# Patient Record
Sex: FEMALE | Race: BLACK OR AFRICAN AMERICAN
[De-identification: names, ages, dates, MRNs, and addresses within clinical notes are randomized per-mention and may not be internally consistent; named-entity substitution may affect disease eponyms.]

---

## 2015-03-13 VITALS — DIASTOLIC BLOOD PRESSURE: 69 MMHG | SYSTOLIC BLOOD PRESSURE: 157 MMHG

## 2017-02-22 ENCOUNTER — HOSPITAL ENCOUNTER (OUTPATIENT)
Dept: HOSPITAL 61 - KCIC CT | Age: 77
Discharge: HOME | End: 2017-02-22
Attending: INTERNAL MEDICINE
Payer: MEDICARE

## 2017-02-22 DIAGNOSIS — R22.0: Primary | ICD-10-CM

## 2017-02-22 PROCEDURE — 70486 CT MAXILLOFACIAL W/O DYE: CPT

## 2017-02-22 NOTE — [facility]
--------------------PQRS STATEMENT------------------- 

One or more of the following individualized dose reduction techniques were

utilized for this study: 

1.Automated exposure control 

2.Adjustment of the mA and/or kV according to patient size 

3.Use of iterative reconstruction technique 

------------------------------------------------------------------ 

CT maxillofacial 

 

Indication: Reason For Study Reason: LEFT FACIAL SWELLING / Spl. 

Instructions: Non contrast facial bones ordered / History: Bump and 

swelling to Lt temporal area. Hx meningioma and occlusion RCA 

 

 

Technique: multiple contiguous axial images were obtained through the 

facial bones. Coronal and sagittal reformations were created. 

 

Findings: 

 

There is no facial fracture. Orbital rims are intact. Temporomandibular 

joints are maintained. Paranasal sinuses are clear. Ostiomeatal units are 

patent. Globes and orbits are within normal limits. There is metallic 

particulate debris superficial to the left zygomatic process, and along 

the frontal scalp the largest metallic foreign body on the right. There is

also metallic debris within the middle ear cavity on the right. These are 

of indeterminate origin. Limited intracranial contents demonstrates 

presence of a solid mass along the cribriform plate measures 3.7 by 2.7 by

1.8 centimeters in could represent a meningioma. 

 

Impression: 

- Negative for facial fracture.

- 3.7 centimeter solid mass along the cribriform plate possibly 

representing a meningioma.

- Metallic radiopaque debris along the frontal scalp and within the right 

middle ear cavity.

 

 

 

Electronically signed by: Golden Miller (Feb 22, 2017 15:23:11)

## 2017-04-05 ENCOUNTER — HOSPITAL ENCOUNTER (INPATIENT)
Dept: HOSPITAL 61 - ER | Age: 77
LOS: 4 days | Discharge: HOME | DRG: 190 | End: 2017-04-09
Attending: INTERNAL MEDICINE | Admitting: INTERNAL MEDICINE
Payer: MEDICARE

## 2017-04-05 VITALS — BODY MASS INDEX: 33.66 KG/M2 | WEIGHT: 190 LBS | HEIGHT: 63 IN

## 2017-04-05 VITALS — DIASTOLIC BLOOD PRESSURE: 70 MMHG | SYSTOLIC BLOOD PRESSURE: 160 MMHG

## 2017-04-05 DIAGNOSIS — Z79.52: ICD-10-CM

## 2017-04-05 DIAGNOSIS — M79.7: ICD-10-CM

## 2017-04-05 DIAGNOSIS — G93.6: ICD-10-CM

## 2017-04-05 DIAGNOSIS — Z92.3: ICD-10-CM

## 2017-04-05 DIAGNOSIS — J44.0: Primary | ICD-10-CM

## 2017-04-05 DIAGNOSIS — G62.9: ICD-10-CM

## 2017-04-05 DIAGNOSIS — Z86.011: ICD-10-CM

## 2017-04-05 DIAGNOSIS — M54.9: ICD-10-CM

## 2017-04-05 DIAGNOSIS — J18.9: ICD-10-CM

## 2017-04-05 DIAGNOSIS — M31.5: ICD-10-CM

## 2017-04-05 DIAGNOSIS — E03.9: ICD-10-CM

## 2017-04-05 DIAGNOSIS — J96.22: ICD-10-CM

## 2017-04-05 DIAGNOSIS — G47.00: ICD-10-CM

## 2017-04-05 DIAGNOSIS — J96.21: ICD-10-CM

## 2017-04-05 DIAGNOSIS — D32.0: ICD-10-CM

## 2017-04-05 DIAGNOSIS — Z88.8: ICD-10-CM

## 2017-04-05 DIAGNOSIS — J30.9: ICD-10-CM

## 2017-04-05 DIAGNOSIS — F17.200: ICD-10-CM

## 2017-04-05 DIAGNOSIS — G44.209: ICD-10-CM

## 2017-04-05 DIAGNOSIS — J44.1: ICD-10-CM

## 2017-04-05 DIAGNOSIS — Z82.49: ICD-10-CM

## 2017-04-05 DIAGNOSIS — M81.0: ICD-10-CM

## 2017-04-05 DIAGNOSIS — Z88.0: ICD-10-CM

## 2017-04-05 DIAGNOSIS — I11.9: ICD-10-CM

## 2017-04-05 DIAGNOSIS — Z86.718: ICD-10-CM

## 2017-04-05 DIAGNOSIS — G89.29: ICD-10-CM

## 2017-04-05 DIAGNOSIS — M19.90: ICD-10-CM

## 2017-04-05 LAB
ALBUMIN SERPL-MCNC: 3.6 G/DL (ref 3.4–5)
ALBUMIN/GLOB SERPL: 0.7 {RATIO} (ref 1–1.7)
ALP SERPL-CCNC: 86 U/L (ref 46–116)
ALT SERPL-CCNC: 13 U/L (ref 14–59)
ANION GAP SERPL CALC-SCNC: 8 MMOL/L (ref 6–14)
AST SERPL-CCNC: 16 U/L (ref 15–37)
BASOPHILS # BLD AUTO: 0.1 X10^3/UL (ref 0–0.2)
BASOPHILS NFR BLD: 1 % (ref 0–3)
BILIRUB SERPL-MCNC: 0.3 MG/DL (ref 0.2–1)
BUN SERPL-MCNC: 9 MG/DL (ref 7–20)
BUN/CREAT SERPL: 13 (ref 6–20)
CALCIUM SERPL-MCNC: 9.8 MG/DL (ref 8.5–10.1)
CHLORIDE SERPL-SCNC: 107 MMOL/L (ref 98–107)
CO2 SERPL-SCNC: 33 MMOL/L (ref 21–32)
CREAT SERPL-MCNC: 0.7 MG/DL (ref 0.6–1)
EOSINOPHIL NFR BLD: 1 % (ref 0–3)
ERYTHROCYTE [DISTWIDTH] IN BLOOD BY AUTOMATED COUNT: 19.2 % (ref 11.5–14.5)
GFR SERPLBLD BASED ON 1.73 SQ M-ARVRAT: 98.7 ML/MIN
GLOBULIN SER-MCNC: 4.9 G/DL (ref 2.2–3.8)
GLUCOSE SERPL-MCNC: 92 MG/DL (ref 70–99)
HCO3 BLDA-SCNC: 30 MMOL/L (ref 21–28)
HCT VFR BLD CALC: 35.9 % (ref 36–47)
HGB BLD-MCNC: 11.2 G/DL (ref 12–15.5)
INSPIRATION SETTING TIME VENT: 32
LYMPHOCYTES # BLD: 2.1 X10^3/UL (ref 1–4.8)
LYMPHOCYTES NFR BLD AUTO: 26 % (ref 24–48)
MCH RBC QN AUTO: 27 PG (ref 25–35)
MCHC RBC AUTO-ENTMCNC: 31 G/DL (ref 31–37)
MCV RBC AUTO: 86 FL (ref 79–100)
MONOCYTES NFR BLD: 8 % (ref 0–9)
NEUTROPHILS NFR BLD AUTO: 64 % (ref 31–73)
OBC FLU: (no result)
PCO2 BLDA: 57 MMHG (ref 35–46)
PH ABG: 7.34 (ref 7.35–7.45)
PLATELET # BLD AUTO: 298 X10^3/UL (ref 140–400)
PO2 BLDA: 79 MMHG (ref 65–108)
POTASSIUM SERPL-SCNC: 3.7 MMOL/L (ref 3.5–5.1)
PROT SERPL-MCNC: 8.5 G/DL (ref 6.4–8.2)
RBC # BLD AUTO: 4.2 X10^6/UL (ref 3.5–5.4)
SAO2 % BLDA: 94 % (ref 92–99)
SODIUM SERPL-SCNC: 148 MMOL/L (ref 136–145)
WBC # BLD AUTO: 8 X10^3/UL (ref 4–11)

## 2017-04-05 PROCEDURE — 85651 RBC SED RATE NONAUTOMATED: CPT

## 2017-04-05 PROCEDURE — 80053 COMPREHEN METABOLIC PANEL: CPT

## 2017-04-05 PROCEDURE — 96375 TX/PRO/DX INJ NEW DRUG ADDON: CPT

## 2017-04-05 PROCEDURE — 80048 BASIC METABOLIC PNL TOTAL CA: CPT

## 2017-04-05 PROCEDURE — A9585 GADOBUTROL INJECTION: HCPCS

## 2017-04-05 PROCEDURE — 71010: CPT

## 2017-04-05 PROCEDURE — 84484 ASSAY OF TROPONIN QUANT: CPT

## 2017-04-05 PROCEDURE — 82805 BLOOD GASES W/O2 SATURATION: CPT

## 2017-04-05 PROCEDURE — 85027 COMPLETE CBC AUTOMATED: CPT

## 2017-04-05 PROCEDURE — 94760 N-INVAS EAR/PLS OXIMETRY 1: CPT

## 2017-04-05 PROCEDURE — 96374 THER/PROPH/DIAG INJ IV PUSH: CPT

## 2017-04-05 PROCEDURE — 93005 ELECTROCARDIOGRAM TRACING: CPT

## 2017-04-05 PROCEDURE — 87804 INFLUENZA ASSAY W/OPTIC: CPT

## 2017-04-05 PROCEDURE — 36600 WITHDRAWAL OF ARTERIAL BLOOD: CPT

## 2017-04-05 PROCEDURE — 94620: CPT

## 2017-04-05 PROCEDURE — 93971 EXTREMITY STUDY: CPT

## 2017-04-05 PROCEDURE — 70553 MRI BRAIN STEM W/O & W/DYE: CPT

## 2017-04-05 PROCEDURE — 36415 COLL VENOUS BLD VENIPUNCTURE: CPT

## 2017-04-05 PROCEDURE — 71275 CT ANGIOGRAPHY CHEST: CPT

## 2017-04-05 PROCEDURE — 83880 ASSAY OF NATRIURETIC PEPTIDE: CPT

## 2017-04-05 PROCEDURE — 94640 AIRWAY INHALATION TREATMENT: CPT

## 2017-04-05 RX ADMIN — METHYLPREDNISOLONE SODIUM SUCCINATE SCH MG: 125 INJECTION, POWDER, FOR SOLUTION INTRAMUSCULAR; INTRAVENOUS at 21:43

## 2017-04-05 RX ADMIN — TIZANIDINE PRN MG: 4 TABLET ORAL at 21:42

## 2017-04-05 RX ADMIN — IPRATROPIUM BROMIDE AND ALBUTEROL SULFATE SCH ML: .5; 3 SOLUTION RESPIRATORY (INHALATION) at 20:00

## 2017-04-05 RX ADMIN — ZOLPIDEM TARTRATE PRN MG: 5 TABLET ORAL at 21:42

## 2017-04-05 RX ADMIN — HYDROCODONE BITARTRATE AND ACETAMINOPHEN PRN TAB: 7.5; 325 TABLET ORAL at 21:43

## 2017-04-05 NOTE — PHYS DOC
Past Medical History


Past Medical History:  COPD, Hypertension, Hypothyroid, Other


Additional Past Medical Histor:  neuropathy, osteoporosis, sleeping problem, 

chronic pain.


Past Surgical History:  Other


Additional Past Surgical Histo:  unknown


Alcohol Use:  None


Drug Use:  None





Adult General


Chief Complaint


Chief Complaint:  SHORTNESS OF BREATH





HPI


HPI


Patient is a 76  year old female brought to the ED by a family member with a 

complaint of short of air for 2 days. She has had a cough. She does have COPD 

and has been using her breathing treatments. She is not on oxygen at home. She 

denies edema. She's not had heart failure.





Review of Systems


Review of Systems





Constitutional: She has felt feverish


Eyes: Denies change in visual acuity, redness, or eye pain []


HENT: Denies nasal congestion or sore throat []


Respiratory: As in history of present illness


Cardiovascular: Denies chest pain or edema


GI: Denies abdominal pain, nausea, vomiting, bloody stools or diarrhea []


: Denies dysuria or hematuria []


Musculoskeletal: Denies back pain or joint pain []


Integument: Denies rash or skin lesions []


Neurologic: Denies headache, focal weakness or sensory changes []





Current Medications


Current Medications





 Current Medications








 Medications


  (Trade)  Dose


 Ordered  Sig/Bouchra  Start Time


 Stop Time Status Last Admin


Dose Admin


 


 Albuterol Sulfate


  (Ventolin Neb


 Soln)  10 mg  1X  ONCE  4/5/17 17:00


 4/5/17 17:01 DC 4/5/17 16:33


10 MG


 


 Albuterol/


 Ipratropium


  (Duoneb)  3 ml  1X  ONCE  4/5/17 16:45


 4/5/17 16:46 DC 4/5/17 16:33


3 ML


 


 Fentanyl Citrate


  (Fentanyl 2ml


 Vial)  50 mcg  1X  ONCE  4/5/17 17:00


 4/5/17 17:01 DC 4/5/17 17:25


50 MCG


 


 Furosemide


  (Lasix)  40 mg  1X  ONCE  4/5/17 17:45


 4/5/17 17:46 DC 4/5/17 19:13


40 MG


 


 Methylprednisolone


 Sodium Succinate


  (Solu-Medrol


 125mg Vial)  80 mg  1X  ONCE  4/5/17 16:45


 4/5/17 16:46 DC 4/5/17 17:25


80 MG











Allergies


Allergies





 Allergies








Coded Allergies Type Severity Reaction Last Updated Verified


 


  Penicillins Allergy Intermediate Rash 4/5/17 Yes


 


  ibuprofen Allergy Intermediate rash 3/13/15 Yes











Physical Exam


Physical Exam





Constitutional: Well developed, well nourished, speaking 2-3 words, appears to 

have mild to moderate dyspnea,


HENT: Normocephalic, atraumatic, bilateral external ears normal, nose normal. []


Eyes:  conjunctiva normal, no discharge. [] 


Neck: Normal range of motion,  no stridor. [] 


Cardiovascular:Heart rate regular rhythm, no murmur []


Lungs & Thorax: Breath sounds present bilaterally, mildly diminished, prolonged 

expiratory phase, expiratory wheezes present throughout


Abdomen: Bowel sounds normal, soft, no tenderness, no masses, no pulsatile 

masses. [] 


Skin: Warm, dry, no erythema, no rash. [] 


Extremities: No tenderness, no cyanosis, no clubbing, ROM intact, no edema. [] 


Neurologic: Alert and oriented X 3, normal motor function, normal sensory 

function, no focal deficits noted. []





Current Patient Data


Vital Signs





 Vital Signs








  Date Time  Temp Pulse Resp B/P Pulse Ox O2 Delivery O2 Flow Rate FiO2


 


4/5/17 17:30  92 26 185/81 99 Nasal Cannula 2 


 


4/5/17 15:48 98.2       





 98.2       








Lab Values





 Laboratory Tests








Test


  4/5/17


15:55 4/5/17


16:00 4/5/17


16:28


 


White Blood Count


  8.0x10^3/uL


(4.0-11.0) 


  


 


 


Red Blood Count


  4.20x10^6/uL


(3.50-5.40) 


  


 


 


Hemoglobin


  11.2g/dL


(12.0-15.5)  L 


  


 


 


Hematocrit


  35.9%


(36.0-47.0)  L 


  


 


 


Mean Corpuscular Volume 86fL ()    


 


Mean Corpuscular Hemoglobin 27pg (25-35)    


 


Mean Corpuscular Hemoglobin


Concent 31g/dL (31-37)


  


  


 


 


Red Cell Distribution Width


  19.2%


(11.5-14.5)  H 


  


 


 


Platelet Count


  298x10^3/uL


(140-400) 


  


 


 


Neutrophils (%) (Auto) 64% (31-73)    


 


Lymphocytes (%) (Auto) 26% (24-48)    


 


Monocytes (%) (Auto) 8% (0-9)    


 


Eosinophils (%) (Auto) 1% (0-3)    


 


Basophils (%) (Auto) 1% (0-3)    


 


Neutrophils # (Auto)


  5.2x10^3uL


(1.8-7.7) 


  


 


 


Lymphocytes # (Auto)


  2.1x10^3/uL


(1.0-4.8) 


  


 


 


Monocytes # (Auto)


  0.6x10^3/uL


(0.0-1.1) 


  


 


 


Eosinophils # (Auto)


  0.1x10^3/uL


(0.0-0.7) 


  


 


 


Basophils # (Auto)


  0.1x10^3/uL


(0.0-0.2) 


  


 


 


Sodium Level


  148mmol/L


(136-145)  H 


  


 


 


Potassium Level


  3.7mmol/L


(3.5-5.1) 


  


 


 


Chloride Level


  107mmol/L


() 


  


 


 


Carbon Dioxide Level


  33mmol/L


(21-32)  H 


  


 


 


Anion Gap 8 (6-14)    


 


Blood Urea Nitrogen 9mg/dL (7-20)    


 


Creatinine


  0.7mg/dL


(0.6-1.0) 


  


 


 


Estimated GFR


(Cockcroft-Gault) 98.7  


  


  


 


 


BUN/Creatinine Ratio 13 (6-20)    


 


Glucose Level


  92mg/dL


(70-99) 


  


 


 


Calcium Level


  9.8mg/dL


(8.5-10.1) 


  


 


 


Total Bilirubin


  0.3mg/dL


(0.2-1.0) 


  


 


 


Aspartate Amino Transferase


(AST) 16U/L (15-37)  


  


  


 


 


Alanine Aminotransferase (ALT)


  13U/L (14-59)


L 


  


 


 


Alkaline Phosphatase


  86U/L ()


  


  


 


 


Troponin I Quantitative


  < 0.017ng/mL


(0.000-0.055) 


  


 


 


NT-Pro-B-Type Natriuretic


Peptide 116pg/mL


(0-449) 


  


 


 


Total Protein


  8.5g/dL


(6.4-8.2)  H 


  


 


 


Albumin


  3.6g/dL


(3.4-5.0) 


  


 


 


Albumin/Globulin Ratio


  0.7 (1.0-1.7)


L 


  


 


 


O2 Saturation  94% (92-99)   


 


Arterial Blood pH


  


  7.34


(7.35-7.45)  L 


 


 


Arterial Blood pCO2 at


Patient Temp 


  57mmHg (35-46)


H 


 


 


Arterial Blood pO2 at Patient


Temp 


  79mmHg


() 


 


 


Arterial Blood HCO3


  


  30mmol/L


(21-28)  H 


 


 


Arterial Blood Base Excess


  


  4mmol/L (-3-3)


H 


 


 


FiO2  32.0   


 


Influenza Type A Antigen


  


  


  Negative


(NEGATIVE)


 


Influenza Type B Antigen


  


  


  Negative


(NEGATIVE)





 Laboratory Tests


4/5/17 15:55








 Laboratory Tests


4/5/17 15:55














EKG


EKG


[]





Radiology/Procedures


Radiology/Procedures


One view chest x-ray read by me. No acute cardiopulmonary process. []





Course & Med Decision Making


Course & Med Decision Making


Pertinent Labs and Imaging studies reviewed. (See chart for details)





ABG on 3 L. PH 7.34 PCO2 57 PO2 79





Patient with COPD history presents with dyspnea for 2 days. She was started on 

a continuous albuterol with DuoNeb. She was saturating at 100% on 3 L I turned 

her down to 2 L. After her continuous nebulizer she felt a little better but is 

still wheezy and still mildly dyspneic with talking. She was also given IV 

steroids. I discussed with the patient being admitted to the hospital for 

treatment of her COPD exacerbation and she is agreeable to that.





Discussed the case with Dr. Matute, the patient's physician, who will admit the 

patient. I wrote bridge orders.





[]





Dragon Disclaimer


Dragon Disclaimer


This electronic medical record was generated, in whole or in part, using a 

voice recognition dictation system.





Departure


Departure


Impression:  


 Primary Impression:  


 COPD with acute exacerbation


Disposition:  09 ADMITTED AS INPATIENT


Admitting Physician:  Saida Matute


Condition:  GUARDED


Referrals:  


SAIDA MATUTE MD (PCP)








WINSTON GILES MD Apr 5, 2017 17:50

## 2017-04-05 NOTE — ACF
Admission Forms Criteria


                                                           COPD





Clinical Indications for Admission to Inpatient Care


                                                                                

(Place 'X' for any and all applicable criteria):





Admission is indicated for ANY ONE of the following (1)(2)(3):


[X]I.    Acute exacerbation by high-risk comorbidity (e.g., pneumonia, 

dysrhythmia, heart failure, pleural effusion, 


        pneumothorax) or severe underlying COPD (e.g., steroid dependent)


[ ]II.   Inpatient admission required rather than observation care (see Chronic 

Obstructive Pulmonary


        Disease: Observation Care) because of ANY ONE of the following:


        [ ]a)   New or pre-existing signs or symptoms of COPD (eg, dyspnea or 

Tachypnea at rest or with


                 minimal activity) that persist despite outpatient and 

observation care treatment


        [ ]b)   New-onset hypoxemia (room air SaO2 less than 90%, PO2 less than 

60 mm Hg (8.0 kPa))


                 that persists despite outpatient and observation care treatment


        [ ]c)   Worsening of pre-existing hypoxemia (eg, new or increased 

requirement for supplemental


                 oxygen to maintain oxygenation at baseline level) that 

persists despite outpatient and


                 observation care treatment, with oxygen treatment needs 

performable only in acute inpatient setting


        [ ]d)   Hypercarbia (PCO2 greater than 40 mm Hg (5.3 kPa))-induced 

respiratory acidosis (pH less


                 than 7.35) that persists despite outpatient and observation 

care treatment


        [ ]e)   Supplemental oxygen or respiratory treatments for over 24 hours 

that are performable only in acute inpatient setting


        [ ]f)    Chest tube placement with active evacuation (e.g., suction, 

drainage) (5)


        [ ]g)   Other condition, treatment or monitoring requiring inpatient 

admission


[ ]III.  Planned invasive surgical or diagnostic procedures requiring acute-

care hospitalization 


[ ]IV. Acute respiratory failure (e.g., uncompensated hypercarbia, severe 

hypoxemia) 


[ ]V.  Severe comorbid condition (e.g., severe steroid myopathy, acute 

vertebral fracture) that has acutely worsened pulmonary function


[ ]VI. Confusion state, lethargy, obtundation, stupor or coma











Extended stay beyond goal length of stay may be needed for (31)(32):


[ ]a ) Respiratory Failure.


[ ]b) Severe or persisting hypoxemia or hypercarbia


[ ]c) Severe or persistent dyspnea


[ ]d) Comorbidities (e.g. chronic heart failure, atrial fibrillation with rapid 

response, pneumonia)


[ ]e) Malnutrition








The original [facility] content created by [facility] 

[facility] has been revised. 


The portions of the content which have been revised are identified through the 

use of italic text or in bold, and [facility] 


has neither reviewed nor approved the  modified material. All other unmodified 

content is copyright   [facility].





Please see references footnoted in the original [facility] edition 

2016


Admission Criteria Met?:  Yes








JOSE MCMAHON Apr 5, 2017 18:00

## 2017-04-05 NOTE — RAD
Exam:  AP portable chest. 



History:  Shortness of air, cough.



Comparison:  11/4/2016.



Findings:  Cardiac silhouette remains enlarged. No pneumothorax or pleural

effusion is seen. Pulmonary vascular redistribution is seen, compatible with

pulmonary vascular congestion. No focal consolidation is identified.     



Impression:  

1.  Pulmonary vascular congestion.

## 2017-04-06 VITALS — DIASTOLIC BLOOD PRESSURE: 85 MMHG | SYSTOLIC BLOOD PRESSURE: 130 MMHG

## 2017-04-06 VITALS — DIASTOLIC BLOOD PRESSURE: 68 MMHG | SYSTOLIC BLOOD PRESSURE: 154 MMHG

## 2017-04-06 VITALS — SYSTOLIC BLOOD PRESSURE: 145 MMHG | DIASTOLIC BLOOD PRESSURE: 82 MMHG

## 2017-04-06 VITALS — SYSTOLIC BLOOD PRESSURE: 127 MMHG | DIASTOLIC BLOOD PRESSURE: 64 MMHG

## 2017-04-06 VITALS — DIASTOLIC BLOOD PRESSURE: 82 MMHG | SYSTOLIC BLOOD PRESSURE: 145 MMHG

## 2017-04-06 VITALS — DIASTOLIC BLOOD PRESSURE: 84 MMHG | SYSTOLIC BLOOD PRESSURE: 159 MMHG

## 2017-04-06 VITALS — SYSTOLIC BLOOD PRESSURE: 168 MMHG | DIASTOLIC BLOOD PRESSURE: 83 MMHG

## 2017-04-06 RX ADMIN — IPRATROPIUM BROMIDE AND ALBUTEROL SULFATE SCH ML: .5; 3 SOLUTION RESPIRATORY (INHALATION) at 12:00

## 2017-04-06 RX ADMIN — IPRATROPIUM BROMIDE AND ALBUTEROL SULFATE SCH ML: .5; 3 SOLUTION RESPIRATORY (INHALATION) at 11:12

## 2017-04-06 RX ADMIN — HYDROCODONE BITARTRATE AND ACETAMINOPHEN PRN TAB: 7.5; 325 TABLET ORAL at 09:34

## 2017-04-06 RX ADMIN — GABAPENTIN SCH MG: 300 CAPSULE ORAL at 09:37

## 2017-04-06 RX ADMIN — IPRATROPIUM BROMIDE AND ALBUTEROL SULFATE SCH ML: .5; 3 SOLUTION RESPIRATORY (INHALATION) at 07:27

## 2017-04-06 RX ADMIN — ZOLPIDEM TARTRATE PRN MG: 5 TABLET ORAL at 21:34

## 2017-04-06 RX ADMIN — METHYLPREDNISOLONE SODIUM SUCCINATE SCH MG: 125 INJECTION, POWDER, FOR SOLUTION INTRAMUSCULAR; INTRAVENOUS at 21:34

## 2017-04-06 RX ADMIN — GABAPENTIN SCH MG: 300 CAPSULE ORAL at 14:03

## 2017-04-06 RX ADMIN — CLONAZEPAM SCH MG: 0.5 TABLET ORAL at 09:38

## 2017-04-06 RX ADMIN — METHYLPREDNISOLONE SODIUM SUCCINATE SCH MG: 125 INJECTION, POWDER, FOR SOLUTION INTRAMUSCULAR; INTRAVENOUS at 09:40

## 2017-04-06 RX ADMIN — LEVOFLOXACIN SCH MG: 500 TABLET, FILM COATED ORAL at 14:03

## 2017-04-06 RX ADMIN — GABAPENTIN SCH MG: 300 CAPSULE ORAL at 21:34

## 2017-04-06 RX ADMIN — IPRATROPIUM BROMIDE AND ALBUTEROL SULFATE SCH ML: .5; 3 SOLUTION RESPIRATORY (INHALATION) at 15:26

## 2017-04-06 RX ADMIN — HYDROCODONE BITARTRATE AND ACETAMINOPHEN PRN TAB: 7.5; 325 TABLET ORAL at 21:35

## 2017-04-06 RX ADMIN — CLONAZEPAM SCH MG: 0.5 TABLET ORAL at 21:34

## 2017-04-06 RX ADMIN — DULOXETINE HYDROCHLORIDE SCH MG: 30 CAPSULE, DELAYED RELEASE ORAL at 09:37

## 2017-04-06 RX ADMIN — FUROSEMIDE SCH MG: 40 TABLET ORAL at 09:36

## 2017-04-06 RX ADMIN — AMLODIPINE BESYLATE SCH MG: 10 TABLET ORAL at 09:35

## 2017-04-06 RX ADMIN — IPRATROPIUM BROMIDE AND ALBUTEROL SULFATE SCH ML: .5; 3 SOLUTION RESPIRATORY (INHALATION) at 20:51

## 2017-04-06 NOTE — RAD
CT of the chest with contrast, 4/6/2017:



History: Shortness of breath



Multidetector CT imaging was performed following an IV bolus injection of

iodinated contrast material. Multiplanar reconstructions were produced

including coronal MIP images. Comparison is made to a study from 6/17/2016



No filling defects are seen in the central pulmonary arteries to suggest

pulmonary emboli. There is moderate calcific plaquing of the thoracic aorta

without evidence of aneurysm. Moderate scattered coronary calcifications are

present. The heart is mildly enlarged. Several small mediastinal lymph nodes

are identified without evidence of pathologic enlargement. The left lobe of

the thyroid gland is surgically absent.



There is a 10-11 mm nodule in the right middle lobe which is unchanged on

studies dating back to at least 10/9/2008. This suggests a benign etiology.



There are multiple other scattered nodular opacities in the lungs which have

developed since 6/17/2016. These include a 6 mm mildly irregular nodule in the

lateral aspect of the left lower lobe as seen on image 70 of series #3, a 4 mm

nodule in the left lower lobe as seen on image 65 and a 6 mm nodule in the

lateral aspect of left upper lobe as seen on image 56. There are additional

smaller scattered tree-in-bud type opacities in both lungs. For example, in

the posterior aspect of the right upper lobe as seen on image 45 and in the

right lower lobe on image 70.



There is underlying emphysema in the lungs. No pleural fluid is evident.



A small hepatic cyst is again noted.



There are moderate scattered degenerative changes in the spine. Old healed rib

fractures are present on the right.



IMPRESSION:

1. No CT evidence of central pulmonary emboli.

2. Moderate coronary artery calcifications.

3. Emphysema.

4. Stable 1 cm right middle lobe pulmonary nodule.

5. New mild scattered tree-in-bud and nodular pulmonary opacities. The

findings suggest infection and/or small airway disease. A neoplastic etiology

is less likely. CT follow-up is suggested.









PQRS Compliance Statement:



One or more of the following individualized dose reduction techniques were

utilized for this examination:

1. Automated exposure control

2. Adjustment of the mA and/or kV according to patient size

3. Use of iterative reconstruction technique

## 2017-04-06 NOTE — PDOC
OBJECTIVE


Vital Signs





Vital Signs








  Date Time  Temp Pulse Resp B/P Pulse Ox O2 Delivery O2 Flow Rate FiO2


 


4/6/17 09:35  72  154/68    


 


4/6/17 09:34      Nasal Cannula 2.0 


 


4/6/17 08:00      Nasal Cannula 2.0 


 


4/6/17 07:28     93 Nasal Cannula 2.0 


 


4/6/17 07:08 97.9 72 20 154/68 94 Nasal Cannula 3.0 





 97.9       


 


4/6/17 03:00 96.8 85 18 127/64 94 Nasal Cannula  





 96.8       


 


4/5/17 23:10      Nasal Cannula 3.0 


 


4/5/17 23:00 99.3 90 18 160/70 95   





 99.3       


 


4/5/17 22:40   17   Nasal Cannula 3.0 


 


4/5/17 21:43   18   Nasal Cannula 3.0 


 


4/5/17 21:34     98 Nasal Cannula 2.0 


 


4/5/17 18:30  80 26 169/74 98 Nasal Cannula 2 


 


4/5/17 18:00  80 24 168/79 100 Nasal Cannula 2 


 


4/5/17 17:30  92 26 185/81 99 Nasal Cannula 2 


 


4/5/17 17:00  80 28 178/85 97 Nasal Cannula 3 


 


4/5/17 16:15     94 Nasal Cannula 2.0 


 


4/5/17 16:00  82 27 193/95 97 Nasal Cannula 3 


 


4/5/17 15:56     99 Nasal Cannula 3.0 


 


4/5/17 15:48 98.2 90 28 193/95 96 Nasal Cannula 3 





 98.2       








I & O











 Intake and Output 


 


 4/6/17





 07:00


 


Intake Total 240 ml


 


Output Total 200 ml


 


Balance 40 ml


 


 


 


Intake Oral 240 ml


 


Output Urine Total 200 ml











ASSESSMENT/PLAN


Assessment/Plan


[id number] H&P dictated


Problems:  





COMMENT


Lab





Laboratory Tests








Test


  4/5/17


15:55 4/5/17


16:00 4/5/17


16:28


 


White Blood Count


  8.0x10^3/uL


(4.0-11.0) 


  


 


 


Red Blood Count


  4.20x10^6/uL


(3.50-5.40) 


  


 


 


Hemoglobin


  11.2g/dL


(12.0-15.5) 


  


 


 


Hematocrit


  35.9%


(36.0-47.0) 


  


 


 


Mean Corpuscular Volume 86fL ()   


 


Mean Corpuscular Hemoglobin 27pg (25-35)   


 


Mean Corpuscular Hemoglobin


Concent 31g/dL (31-37) 


  


  


 


 


Red Cell Distribution Width


  19.2%


(11.5-14.5) 


  


 


 


Platelet Count


  298x10^3/uL


(140-400) 


  


 


 


Neutrophils (%) (Auto) 64% (31-73)   


 


Lymphocytes (%) (Auto) 26% (24-48)   


 


Monocytes (%) (Auto) 8% (0-9)   


 


Eosinophils (%) (Auto) 1% (0-3)   


 


Basophils (%) (Auto) 1% (0-3)   


 


Neutrophils # (Auto)


  5.2x10^3uL


(1.8-7.7) 


  


 


 


Lymphocytes # (Auto)


  2.1x10^3/uL


(1.0-4.8) 


  


 


 


Monocytes # (Auto)


  0.6x10^3/uL


(0.0-1.1) 


  


 


 


Eosinophils # (Auto)


  0.1x10^3/uL


(0.0-0.7) 


  


 


 


Basophils # (Auto)


  0.1x10^3/uL


(0.0-0.2) 


  


 


 


Sodium Level


  148mmol/L


(136-145) 


  


 


 


Potassium Level


  3.7mmol/L


(3.5-5.1) 


  


 


 


Chloride Level


  107mmol/L


() 


  


 


 


Carbon Dioxide Level


  33mmol/L


(21-32) 


  


 


 


Anion Gap 8 (6-14)   


 


Blood Urea Nitrogen 9mg/dL (7-20)   


 


Creatinine


  0.7mg/dL


(0.6-1.0) 


  


 


 


Estimated GFR


(Cockcroft-Gault) 98.7 


  


  


 


 


BUN/Creatinine Ratio 13 (6-20)   


 


Glucose Level


  92mg/dL


(70-99) 


  


 


 


Calcium Level


  9.8mg/dL


(8.5-10.1) 


  


 


 


Total Bilirubin


  0.3mg/dL


(0.2-1.0) 


  


 


 


Aspartate Amino Transf


(AST/SGOT) 16U/L (15-37) 


  


  


 


 


Alanine Aminotransferase


(ALT/SGPT) 13U/L (14-59) 


  


  


 


 


Alkaline Phosphatase 86U/L ()   


 


Troponin I Quantitative


  < 0.017ng/mL


(0.000-0.055) 


  


 


 


NT-Pro-B-Type Natriuretic


Peptide 116pg/mL


(0-449) 


  


 


 


Total Protein


  8.5g/dL


(6.4-8.2) 


  


 


 


Albumin


  3.6g/dL


(3.4-5.0) 


  


 


 


Albumin/Globulin Ratio 0.7 (1.0-1.7)   


 


O2 Saturation  94% (92-99)  


 


Arterial Blood pH


  


  7.34


(7.35-7.45) 


 


 


Arterial Blood pCO2 at


Patient Temp 


  57mmHg (35-46) 


  


 


 


Arterial Blood pO2 at Patient


Temp 


  79mmHg


() 


 


 


Arterial Blood HCO3


  


  30mmol/L


(21-28) 


 


 


Arterial Blood Base Excess  4mmol/L (-3-3)  


 


FiO2  32.0  


 


Influenza Type A Antigen


  


  


  Negative


(NEGATIVE)


 


Influenza Type B Antigen


  


  


  Negative


(NEGATIVE)














SAIDA MATUTE MD Apr 6, 2017 10:08

## 2017-04-06 NOTE — EKG
[facility]

              [address] [place] [address]

Test Date:    2017               Test Time:    15:59:00

Pat Name:     GLENYS MUNOZ            Department:   

Patient ID:   [id number]           Room:          

Gender:       F                        Technician:   

:          [id number]               Requested By: WINSTON GILES

Order Number: [id number]            Reading MD:     

                                 Measurements

Intervals                              Axis          

Rate:         85                       P:            59

MN:           148                      QRS:          98

QRSD:         122                      T:            33

QT:           396                                    

QTc:          477                                    

                           Interpretive Statements

SINUS RHYTHM

RIGHTWARD AXIS

INCOMPLETE RIGHT BUNDLE BRANCH BLOCK

RVH WITH REPOLARIZATION ABNORMALITY

ABNORMAL ECG

RI6.01

No previous ECG available for comparison

## 2017-04-06 NOTE — CONS
DATE OF CONSULTATION:  04/06/2017



ATTENDING PHYSICIAN:  Dr. Jostin El.



REASON FOR CONSULTATION:  Dyspnea.



HISTORY OF PRESENT ILLNESS:  This is a 76-year-old who came to Emergency Room

with increasing shortness of breath for the past few days.  She has been

coughing, but the cough has been nonproductive.  She also was having chest pain

substernally.  She has history of DVT about 5 years ago for which she was

treated with warfarin and was subsequently taken off after it was reportedly

resolved.  She said she has some swelling in her left lower extremity as well

with some pain and tenderness.  She underwent CT angiogram.  The report has not

been dictated yet, but I have briefly looked at it, and I did not see any

obvious pulmonary embolism.  There is a nodule in the right middle lobe.  There

is also faint patchy infiltrate in the right upper lobe and nodular infiltrate

in the left lung close to pleura.  Consultation was requested for further

evaluation and management.  She also takes chronic steroids for polymyalgia

rheumatica.



PAST MEDICAL HISTORY:  Polymyalgia rheumatica.  She has been on prednisone 10 mg

daily.  History of underlying chronic obstructive pulmonary disease, history of

DVT involving the left lower extremity, history of DJD, osteoporosis,

hypothyroidism, and insomnia.



SOCIAL HISTORY:  She drinks occasionally.  She has history of tobaccoism for at

least 30 years before quitting more than 10 years ago.



FAMILY HISTORY:  Noncontributory to lungs.



REVIEW OF SYSTEMS:  Twelve-point system obtained.  Pertinent positives discussed

in my history of present illness, otherwise noncontributory.  All systems that

were negative were reviewed as well.



ALLERGIES:  PENICILLIN AND IBUPROFEN.



CURRENT MEDICATIONS:  Reviewed as listed in the MRAD including steroids and

DuoNebs.



PHYSICAL EXAMINATION:

VITAL SIGNS:  Blood pressure 168/83, afebrile, and pulse oximetry 95% on 3

liters.

NECK:  Supple.

LUNGS:  Diminished breath sounds.  No wheezing.

CARDIOVASCULAR:  Regular rate and rhythm.

ABDOMEN:  Soft, nontender, and obese.

EXTREMITIES:  With some tenderness and swelling in the left lower extremity.



LABORATORY DATA:  Reviewed.  White cell count 8.0, hemoglobin 11.2, and

platelets are 298.  ABGs with a pH of 7.34, pCO2 of 57, and pO2 of 79 on 32%

FIO2.  BUN and creatinine are normal.



IMPRESSION:

1.  Acute on chronic hypercapnic respiratory failure secondary to chronic

obstructive pulmonary disease exacerbation and mild patchy pneumonitis.

2.  History of left lower extremity treated 5 years ago with Coumadin.  Now

comes in with some swelling in the left lower extremity and some chest pain.  CT

angiogram with no definite pulmonary embolism.  We will obtain followup venous

Dopplers of left lower extremity.

3.  Mild patchy pneumonitis.

4.  History of polymyalgia rheumatica, on chronic steroids.



RECOMMENDATIONS:

1.  Continue with present nebulizer treatment.

2.  Continue with oxygen.

3.  Hold off on BiPAP for now and follow up ABGs.

4.  Empiric antibiotics.

5.  IV Solu-Medrol.

6.  Venous Dopplers of left lower extremity.

7.  Follow the official CT chest results to make sure that the right lung nodule

is stable.

 



______________________________

JAMI ACHARYA MD



DR:  SALAS/shilpa  JOB#:  [id number] / [id number]

DD:  04/06/2017 12:22  DT:  04/06/2017 12:52

## 2017-04-06 NOTE — PREOP HP
DATE OF SERVICE:  



HISTORY OF PRESENT ILLNESS:  The patient is in room 672.  She is a 76-year-old

lady who presented to the Emergency Room yesterday due to increasing shortness

of breath.  She has been coughing and having difficulty breathing, but her

difficulty breathing and shortness of breath became much worse.  She could not

get her breath and presented to the Emergency Room.  She was complaining of

pleuritic type chest pain with deep breath and feels that her breathing and

chest is very tight, cannot take a deep breath.  She does have a previous

history of COPD but has never had required hospitalization for COPD

exacerbation.  She does have pain in the lower extremities, but it seems more of

a neuropathic pain.  She denies long travel recently.  Denies calf pain and

denies swelling in the lower extremities.



PAST MEDICAL HISTORY:  Significant for polymyalgia rheumatica diagnosed over the

last year.  She has been on steroids with a dose of prednisone 10 mg lately. 

She does see Dr. Vasquez, Rheumatology.  She does have hypertension, COPD,

previous history of lung nodule that has been stable, history of peripheral

neuropathy, and chronic back pain and neck pain.  She does have a history of

meningioma, large, in the past that has required radiation treatment at . 

Since then, she has stopped following up for the last 3 years.  Most recently,

she has been complaining of increasing headache.  She does have osteoarthritis,

degenerative joint disease, osteoporosis, hypothyroidism, and insomnia.



SOCIAL HISTORY:  She drinks alcohol occasionally.  She cut down significantly on

smoking but still smokes periodically.  She does have a previous long history of

smoking.  Denies other drug use.



FAMILY HISTORY:  Positive for hypertension and coronary artery disease.



REVIEW OF SYSTEMS:

CONSTITUTIONAL:  She stated that she had felt feverish but denies chills, and

there is no weight loss.

EYES:  Denies visual changes.

HEENT:  Denies nasal congestion or sore throat.

RESPIRATORY:  She does have increasing shortness of breath, cough, and pleuritic

chest pain with deep breath in the retrosternal area.  No chest wall pain.

CARDIOVASCULAR:  Denies chest pain at rest unless she is trying to take a deep

breath.  Denies edema.  She has no prior history of coronary artery disease

herself.

GASTROINTESTINAL:  She denies abdominal pain, nausea, vomiting, bloody stool, or

diarrhea.

GENITOURINARY:  Denies dysuria.  She does have stress incontinence.

MUSCULOSKELETAL:  She does have chronic back pain and arthritis pain.  All body

ache and pain in her feet, neuropathy.

NEUROLOGY:  She does have a headache, worse lately.  She was referred for MRI of

the brain and Neurology consult as outpatient, but she has not gotten to do that

yet.  She denies weakness on one side or new sensory changes.



ALLERGIES:  SHE IS ALLERGIC TO PENICILLIN AND IBUPROFEN.



PHYSICAL EXAMINATION:

CONSTITUTIONAL:  She is well-developed, well-nourished.

HEENT:  Normocephalic, atraumatic.

EYES:  Conjunctivae are normal with no discharge.

NECK:  Supple, no stridor.

CARDIOVASCULAR:  Regular rate and rhythm.

LUNGS:  Decreased breath sounds, very few scattered wheezes.

ABDOMEN:  Soft, nontender.  No organomegaly, no masses, no bruits, no ascites.

SKIN:  Warm and dry.

EXTREMITIES:  No edema, clubbing, or cyanosis.  Negative Lindsay sign, no calf

pain.

NEUROLOGIC:  Alert and oriented.  She moves all her extremities without

difficulty.  She does not have any focal deficit.



IMPRESSION:

1.  Shortness of breath with hypoxia, could due to chronic obstructive pulmonary

disease exacerbation, but due to the acuteness of her symptoms and the degree of

her hypoxia, we will check CT to rule out pulmonary embolus.

2.  Previous history of pulmonary nodules, followed by Pulmonary and has not had

any changes over the last several years.

3.  Headache with previous history of large meningioma.  We will ask Neurology

to see and obtain MRI of the brain.

4.  History of polymyalgia rheumatica, on chronic steroids.  She is currently on

IV steroids for  chronic obstructive pulmonary disease.

5.  Chronic obstructive pulmonary disease exacerbation and bronchitis.

6.  Peripheral neuropathy.

7.  Hypertension, hypertensive cardiovascular disease.

8.  Osteoarthritis.

9.  Hypothyroidism.

 



______________________________

SAIDA MATUTE MD



DR:  CHIQUIS/shilpa  JOB#:  [id number] / [id number]

DD:  04/06/2017 10:07  DT:  04/06/2017 10:56

## 2017-04-06 NOTE — RAD
PROCEDURE 

MRI brain without and with contrast 

 

HISTORY 

Meningioma, headache 

 

TECHNIQUE 

Multiplanar, multi sequential pre and post contrast MR imaging was 

performed of the brain. 

Contrast: 7.5 cc Gadavist 

 

COMPARISON 

None available 

 

FINDINGS 

There is enhancing extra-axial mass of the floor of the anterior cranial 

fossa with bilateral parafalcine extent, extending along the planum 

sphenoidale and cribriform plate, also extent to the suprasellar cistern 

and contacting the superior aspect of the pituitary gland. There is also 

contact and mild displacement posteriorly of the infundibulum. Pre 

chiasmatic optic nerves are apparently displaced inferiorly although 

poorly distinguished on this exam. Mass measures up to 3.9 cm AP by 2.6 cm

cc by 3.4 cm transverse. Mass measures slightly larger on the order of 0.2

cm in the transverse and AP planes. Masses seen along the anterior 

surfaces of the anterior cerebral arteries. There is new T2 and FLAIR 

hyperintense signal abnormality of the adjacent right frontal lobe. There 

is small focus of encephalomalacia with cortical involvement of the right 

frontal lobe as seen previously. 

No new parenchymal enhancement is identified. There is again absence of 

the right internal carotid artery flow void. Ventricular size is 

proportionate to the sulcal spaces, lateral ventricles very slightly 

larger although may be due to atrophy. There is no midline shift or 

extra-axial fluid collection. Mild T2 and FLAIR hyperintense signal 

abnormality of the sofie is greater in interval. There is mild mucosal 

thickening of the ethmoid air cells and sphenoid sinus. There has been 

lens surgery on the right in interval. 

 

IMPRESSION 

1. There is again a large extra-axial enhancing mass compatible with 

meningioma of the floor of the anterior cranial fossa, extends to the 

suprasellar cistern and contacts pituitary infundibulum as well as 

displaces the optic nerves inferiorly. Mass is very slightly larger on the

order of 0.2 cm in AP and transverse planes. There has been development 

since the previous exam of gliosis or edema of the right frontal lobe 

adjacent to the mass.

2. 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

absence of flow void.

3. There is again focus of encephalomalacia with cortical involving the 

right frontal lobe. Mild T2 and FLAIR hyperintense signal abnormality of 

the sofie is greater, probably due to chronic microvascular ischemic 

disease.

 

 

 

Electronically signed by: Sandoval Morgan MD (Apr 06, 2017 15:49:28)

## 2017-04-06 NOTE — PDOC2
NEUROLOGY CONSULT


Date of Admission


Date of Admission


DATE: 4/6/17 


TIME: 14:23





Reason for Consult


Reason for Consult:


Headache, history of meningioma





Referring Physician


Referring Physician:


Dr. El





Source


Source:  Chart review, Patient





History of Present Illness


History of Present Illness


The patient is a 76-year-old right-handed female admitted for COPD exacerbation 

who has a several month history of headaches. She describes left temporal pain 

radiating down the cheek and over into the forehead. She has a history of 

meningioma with radiation treatment at . She denies photo phonophobia or 

nausea with the headaches. She does have a history of fibromyalgia. There is no 

history of stroke, seizure, or head injury.





Past Medical History


Cardiovascular:  HTN


Pulmonary:  COPD


CENTRAL NERVOUS SYSTEM:  Periperal neuropathy, Other (meningioma, treated with 

radiation treatments)


Musculoskeletal:  Osteoarthritis


Rheumatologic:  Fibromyalgia


Endocrine:  Hypothyroidism





Past Surgical History


Past Surgical History:  No pertinent history





Family History


Family History:  CAD





Social History


Social History


She quit using alcohol and tobacco several years ago





Current Medications


Current Medications





 Current Medications


Albuterol/ Ipratropium (Duoneb) 3 ml 1X  ONCE NEB  Last administered on 4/5/ 17at 16:33;  Start 4/5/17 at 16:45;  Stop 4/5/17 at 16:46;  Status DC


Methylprednisolone Sodium Succinate (Solu-Medrol 125mg Vial) 80 mg 1X  ONCE IV  

Last administered on 4/5/17at 17:25;  Start 4/5/17 at 16:45;  Stop 4/5/17 at 16:

46;  Status DC


Albuterol Sulfate (Ventolin Neb Soln) 10 mg 1X  ONCE CONT NEB  Last 

administered on 4/5/17at 16:33;  Start 4/5/17 at 17:00;  Stop 4/5/17 at 17:01;  

Status DC


Fentanyl Citrate (Fentanyl 2ml Vial) 50 mcg 1X  ONCE IV  Last administered on 4/ 5/17at 17:25;  Start 4/5/17 at 17:00;  Stop 4/5/17 at 17:01;  Status DC


Furosemide (Lasix) 40 mg 1X  ONCE IVP  Last administered on 4/5/17at 19:13;  

Start 4/5/17 at 17:45;  Stop 4/5/17 at 17:46;  Status DC


Albuterol/ Ipratropium (Duoneb) 3 ml RTQID NEB  Last administered on 4/6/17at 11

:12;  Start 4/5/17 at 20:00;  Stop 4/6/17 at 12:44;  Status DC


Methylprednisolone Sodium Succinate (Solu-Medrol 125mg Vial) 125 mg Q12HR IV  

Last administered on 4/6/17at 09:40;  Start 4/5/17 at 21:00


Albuterol/ Ipratropium (Duoneb) 3 ml RTQID NEB  Last administered on 4/6/17at 07

:27;  Start 4/6/17 at 08:00


Acetaminophen (Tylenol) 650 mg PRN Q4HRS  PRN PO PAIN;  Start 4/5/17 at 20:30


Amlodipine Besylate (Norvasc) 10 mg DAILY08 PO  Last administered on 4/6/17at 09

:35;  Start 4/6/17 at 08:00


Furosemide (Lasix) 40 mg DAILY08 PO  Last administered on 4/6/17at 09:36;  

Start 4/6/17 at 08:00


Acetaminophen/ Hydrocodone Bitart (Lortab 7.5/325) 1 tab PRN Q8HRS  PRN PO PAIN 

Last administered on 4/6/17at 09:34;  Start 4/5/17 at 20:30


Tizanidine HCl (Zanaflex) 4 mg PRN Q8HRS  PRN PO MUSCLE SPASMS Last 

administered on 4/5/17at 21:42;  Start 4/5/17 at 20:30


Zolpidem Tartrate (Ambien) 5 mg QHS  PRN PO SLEEP Last administered on 4/5/17at 

21:42;  Start 4/5/17 at 21:00


Clonazepam (Klonopin) 0.5 mg BID PO  Last administered on 4/6/17at 09:38;  

Start 4/6/17 at 10:00


Gabapentin (Neurontin) 300 mg TID PO  Last administered on 4/6/17at 14:03;  

Start 4/6/17 at 10:00


Duloxetine HCl (Cymbalta) 60 mg DAILY PO  Last administered on 4/6/17at 09:37;  

Start 4/6/17 at 10:00


Iohexol (Omnipaque 300 Mg/ml) 75 ml 1X  ONCE IV  Last administered on 4/6/17at 

10:14;  Start 4/6/17 at 10:00;  Stop 4/6/17 at 10:01;  Status DC


Info (Do NOT chart on this entry -- for MONITORING) 1 each PRN DAILY  PRN MC 

SEE COMMENTS;  Start 4/6/17 at 10:00;  Stop 4/8/17 at 09:59


Levofloxacin (Levaquin) 500 mg DAILY06 PO  Last administered on 4/6/17at 14:03;

  Start 4/6/17 at 13:00


Diazepam (Valium) 10 mg 1X  ONCE IV  Last administered on 4/6/17at 14:10;  

Start 4/6/17 at 14:15;  Stop 4/6/17 at 14:16;  Status DC





Active Scripts


Active


Reported


Tylenol (Acetaminophen) 325 Mg Tablet 2 Tab PO PRN Q4HRS


Hydrocodone-Apap 7.5-325  ** (Hydrocodone Bit/Acetaminophen) 1 Each Tablet 1 

Tab PO Q8HRS PRN


Tizanidine Hcl 4 Mg Tablet   


Zolpidem Tartrate 5 Mg Tablet   


Furosemide 40 Mg Tablet   


Benazepril Hcl 40 Mg Tablet   


Amlodipine Besylate 10 Mg Tablet





Allergies


Allergies:  


Coded Allergies:  


     Penicillins (Verified  Allergy, Intermediate, Rash, 4/5/17)


     ibuprofen (Verified  Allergy, Intermediate, rash, 3/13/15)





ROS


Review of System


Patient denies fevers, chills, weight loss, dyspnea, angina, abdominal pain, 

change in bowels, or dysuria. 14 point review of systems is negative.





Physical Exam


Physical Examination


PHYSICAL EXAMINATION:  


Vital signs: see above.  


General appearance is normal and in no acute distress.  


HEENT:  Normocephalic and nontraumatic.  Eyes, nose, ears, and throat are 

unremarkable.  


Neck is supple. No lymphadenopathy. No bruits are heard over the carotid 

artery.  No crepitus. 


NEUROLOGICAL EXAMINATION:  


Mental Status Examination:  Alert. Oriented to time, place, and person. Answers 

questions and follows commends. Pupils are equal round and reactive to light 

and accommodation.  Funduscopic exam:  No papilledema.  Extraocular movements 

are intact.   Visual field exam shows no defect on the direct confrontation.  

No motor or sensory deficits on the facial exam.  Uvula in the midline and the 

soft palate elevated symmetrically.  No deviation of the tongue to any 

direction.  Gross hearing is normal.  Shoulder shrug normal.  Muscle tone is 

normal.  Muscle strength is 5.  Deep tendon reflexes are 2+ all around.  

Plantar reflex is with flexion response bilaterally.  Finger-to-nose test 

performance is accurate.  Tandem walk test is accurate.  Alternative movements 

are accurate.  Romberg test is negative. Gait is normal.  Sensory exam shows no 

deficits. No cerebellar signs are elicited.





Vitals


VITALS





 Vital Signs








  Date Time  Temp Pulse Resp B/P Pulse Ox O2 Delivery O2 Flow Rate FiO2


 


4/6/17 11:13      Nasal Cannula 2.0 


 


4/6/17 10:59 98.2 84 20 168/83 95   





 98.2       











Labs


Labs





Laboratory Tests








Test


  4/5/17


15:55 4/5/17


16:00 4/5/17


16:28


 


White Blood Count


  8.0x10^3/uL


(4.0-11.0) 


  


 


 


Red Blood Count


  4.20x10^6/uL


(3.50-5.40) 


  


 


 


Hemoglobin


  11.2g/dL


(12.0-15.5) 


  


 


 


Hematocrit


  35.9%


(36.0-47.0) 


  


 


 


Mean Corpuscular Volume 86fL ()   


 


Mean Corpuscular Hemoglobin 27pg (25-35)   


 


Mean Corpuscular Hemoglobin


Concent 31g/dL (31-37) 


  


  


 


 


Red Cell Distribution Width


  19.2%


(11.5-14.5) 


  


 


 


Platelet Count


  298x10^3/uL


(140-400) 


  


 


 


Neutrophils (%) (Auto) 64% (31-73)   


 


Lymphocytes (%) (Auto) 26% (24-48)   


 


Monocytes (%) (Auto) 8% (0-9)   


 


Eosinophils (%) (Auto) 1% (0-3)   


 


Basophils (%) (Auto) 1% (0-3)   


 


Neutrophils # (Auto)


  5.2x10^3uL


(1.8-7.7) 


  


 


 


Lymphocytes # (Auto)


  2.1x10^3/uL


(1.0-4.8) 


  


 


 


Monocytes # (Auto)


  0.6x10^3/uL


(0.0-1.1) 


  


 


 


Eosinophils # (Auto)


  0.1x10^3/uL


(0.0-0.7) 


  


 


 


Basophils # (Auto)


  0.1x10^3/uL


(0.0-0.2) 


  


 


 


Sodium Level


  148mmol/L


(136-145) 


  


 


 


Potassium Level


  3.7mmol/L


(3.5-5.1) 


  


 


 


Chloride Level


  107mmol/L


() 


  


 


 


Carbon Dioxide Level


  33mmol/L


(21-32) 


  


 


 


Anion Gap 8 (6-14)   


 


Blood Urea Nitrogen 9mg/dL (7-20)   


 


Creatinine


  0.7mg/dL


(0.6-1.0) 


  


 


 


Estimated GFR


(Cockcroft-Gault) 98.7 


  


  


 


 


BUN/Creatinine Ratio 13 (6-20)   


 


Glucose Level


  92mg/dL


(70-99) 


  


 


 


Calcium Level


  9.8mg/dL


(8.5-10.1) 


  


 


 


Total Bilirubin


  0.3mg/dL


(0.2-1.0) 


  


 


 


Aspartate Amino Transf


(AST/SGOT) 16U/L (15-37) 


  


  


 


 


Alanine Aminotransferase


(ALT/SGPT) 13U/L (14-59) 


  


  


 


 


Alkaline Phosphatase 86U/L ()   


 


Troponin I Quantitative


  < 0.017ng/mL


(0.000-0.055) 


  


 


 


NT-Pro-B-Type Natriuretic


Peptide 116pg/mL


(0-449) 


  


 


 


Total Protein


  8.5g/dL


(6.4-8.2) 


  


 


 


Albumin


  3.6g/dL


(3.4-5.0) 


  


 


 


Albumin/Globulin Ratio 0.7 (1.0-1.7)   


 


O2 Saturation  94% (92-99)  


 


Arterial Blood pH


  


  7.34


(7.35-7.45) 


 


 


Arterial Blood pCO2 at


Patient Temp 


  57mmHg (35-46) 


  


 


 


Arterial Blood pO2 at Patient


Temp 


  79mmHg


() 


 


 


Arterial Blood HCO3


  


  30mmol/L


(21-28) 


 


 


Arterial Blood Base Excess  4mmol/L (-3-3)  


 


FiO2  32.0  


 


Influenza Type A Antigen


  


  


  Negative


(NEGATIVE)


 


Influenza Type B Antigen


  


  


  Negative


(NEGATIVE)








Laboratory Tests








Test


  4/5/17


15:55 4/5/17


16:00 4/5/17


16:28


 


White Blood Count


  8.0x10^3/uL


(4.0-11.0) 


  


 


 


Red Blood Count


  4.20x10^6/uL


(3.50-5.40) 


  


 


 


Hemoglobin


  11.2g/dL


(12.0-15.5) 


  


 


 


Hematocrit


  35.9%


(36.0-47.0) 


  


 


 


Mean Corpuscular Volume 86fL ()   


 


Mean Corpuscular Hemoglobin 27pg (25-35)   


 


Mean Corpuscular Hemoglobin


Concent 31g/dL (31-37) 


  


  


 


 


Red Cell Distribution Width


  19.2%


(11.5-14.5) 


  


 


 


Platelet Count


  298x10^3/uL


(140-400) 


  


 


 


Neutrophils (%) (Auto) 64% (31-73)   


 


Lymphocytes (%) (Auto) 26% (24-48)   


 


Monocytes (%) (Auto) 8% (0-9)   


 


Eosinophils (%) (Auto) 1% (0-3)   


 


Basophils (%) (Auto) 1% (0-3)   


 


Neutrophils # (Auto)


  5.2x10^3uL


(1.8-7.7) 


  


 


 


Lymphocytes # (Auto)


  2.1x10^3/uL


(1.0-4.8) 


  


 


 


Monocytes # (Auto)


  0.6x10^3/uL


(0.0-1.1) 


  


 


 


Eosinophils # (Auto)


  0.1x10^3/uL


(0.0-0.7) 


  


 


 


Basophils # (Auto)


  0.1x10^3/uL


(0.0-0.2) 


  


 


 


Sodium Level


  148mmol/L


(136-145) 


  


 


 


Potassium Level


  3.7mmol/L


(3.5-5.1) 


  


 


 


Chloride Level


  107mmol/L


() 


  


 


 


Carbon Dioxide Level


  33mmol/L


(21-32) 


  


 


 


Anion Gap 8 (6-14)   


 


Blood Urea Nitrogen 9mg/dL (7-20)   


 


Creatinine


  0.7mg/dL


(0.6-1.0) 


  


 


 


Estimated GFR


(Cockcroft-Gault) 98.7 


  


  


 


 


BUN/Creatinine Ratio 13 (6-20)   


 


Glucose Level


  92mg/dL


(70-99) 


  


 


 


Calcium Level


  9.8mg/dL


(8.5-10.1) 


  


 


 


Total Bilirubin


  0.3mg/dL


(0.2-1.0) 


  


 


 


Aspartate Amino Transf


(AST/SGOT) 16U/L (15-37) 


  


  


 


 


Alanine Aminotransferase


(ALT/SGPT) 13U/L (14-59) 


  


  


 


 


Alkaline Phosphatase 86U/L ()   


 


Troponin I Quantitative


  < 0.017ng/mL


(0.000-0.055) 


  


 


 


NT-Pro-B-Type Natriuretic


Peptide 116pg/mL


(0-449) 


  


 


 


Total Protein


  8.5g/dL


(6.4-8.2) 


  


 


 


Albumin


  3.6g/dL


(3.4-5.0) 


  


 


 


Albumin/Globulin Ratio 0.7 (1.0-1.7)   


 


O2 Saturation  94% (92-99)  


 


Arterial Blood pH


  


  7.34


(7.35-7.45) 


 


 


Arterial Blood pCO2 at


Patient Temp 


  57mmHg (35-46) 


  


 


 


Arterial Blood pO2 at Patient


Temp 


  79mmHg


() 


 


 


Arterial Blood HCO3


  


  30mmol/L


(21-28) 


 


 


Arterial Blood Base Excess  4mmol/L (-3-3)  


 


FiO2  32.0  


 


Influenza Type A Antigen


  


  


  Negative


(NEGATIVE)


 


Influenza Type B Antigen


  


  


  Negative


(NEGATIVE)











Assessment/Plan


Assessment/Plan


Impression:


Tension headaches


Fibromyalgia


History of meningioma





Recommendations:


Await  MRI


Await sedimentation rate


Treatment of headaches would be difficult because she is already on several 

medications for fibromyalgia including gabapentin, Cymbalta, and tizanidine. 

These are usually the best medications for tension headache as well. I may 

consider increasing the dose of the gabapentin or ties remain. I do not feel 

comfortable increasing the dose of Cymbalta.





Thank you for letting me help with the patient's care.








ADRIANO ROGERS MD Apr 6, 2017 14:28

## 2017-04-07 VITALS — DIASTOLIC BLOOD PRESSURE: 68 MMHG | SYSTOLIC BLOOD PRESSURE: 152 MMHG

## 2017-04-07 VITALS — DIASTOLIC BLOOD PRESSURE: 68 MMHG | SYSTOLIC BLOOD PRESSURE: 145 MMHG

## 2017-04-07 VITALS — SYSTOLIC BLOOD PRESSURE: 130 MMHG | DIASTOLIC BLOOD PRESSURE: 85 MMHG

## 2017-04-07 VITALS — SYSTOLIC BLOOD PRESSURE: 127 MMHG | DIASTOLIC BLOOD PRESSURE: 79 MMHG

## 2017-04-07 VITALS — DIASTOLIC BLOOD PRESSURE: 90 MMHG | SYSTOLIC BLOOD PRESSURE: 172 MMHG

## 2017-04-07 VITALS — DIASTOLIC BLOOD PRESSURE: 79 MMHG | SYSTOLIC BLOOD PRESSURE: 156 MMHG

## 2017-04-07 VITALS — DIASTOLIC BLOOD PRESSURE: 73 MMHG | SYSTOLIC BLOOD PRESSURE: 142 MMHG

## 2017-04-07 LAB
ANION GAP SERPL CALC-SCNC: 4 MMOL/L (ref 6–14)
BUN SERPL-MCNC: 21 MG/DL (ref 7–20)
CALCIUM SERPL-MCNC: 9.5 MG/DL (ref 8.5–10.1)
CHLORIDE SERPL-SCNC: 101 MMOL/L (ref 98–107)
CO2 SERPL-SCNC: 35 MMOL/L (ref 21–32)
CREAT SERPL-MCNC: 0.8 MG/DL (ref 0.6–1)
ERYTHROCYTE [DISTWIDTH] IN BLOOD BY AUTOMATED COUNT: 18.9 % (ref 11.5–14.5)
GFR SERPLBLD BASED ON 1.73 SQ M-ARVRAT: 84.6 ML/MIN
GLUCOSE SERPL-MCNC: 151 MG/DL (ref 70–99)
HCT VFR BLD CALC: 32.4 % (ref 36–47)
HGB BLD-MCNC: 9.9 G/DL (ref 12–15.5)
MCH RBC QN AUTO: 26 PG (ref 25–35)
MCHC RBC AUTO-ENTMCNC: 31 G/DL (ref 31–37)
MCV RBC AUTO: 85 FL (ref 79–100)
PLATELET # BLD AUTO: 306 X10^3/UL (ref 140–400)
POTASSIUM SERPL-SCNC: 4.4 MMOL/L (ref 3.5–5.1)
RBC # BLD AUTO: 3.82 X10^6/UL (ref 3.5–5.4)
SODIUM SERPL-SCNC: 140 MMOL/L (ref 136–145)
WBC # BLD AUTO: 11.6 X10^3/UL (ref 4–11)

## 2017-04-07 RX ADMIN — AMLODIPINE BESYLATE SCH MG: 10 TABLET ORAL at 09:04

## 2017-04-07 RX ADMIN — GUAIFENESIN SCH MG: 600 TABLET, EXTENDED RELEASE ORAL at 21:17

## 2017-04-07 RX ADMIN — METHYLPREDNISOLONE SODIUM SUCCINATE SCH MG: 125 INJECTION, POWDER, FOR SOLUTION INTRAMUSCULAR; INTRAVENOUS at 09:02

## 2017-04-07 RX ADMIN — GABAPENTIN SCH MG: 300 CAPSULE ORAL at 21:17

## 2017-04-07 RX ADMIN — TIZANIDINE PRN MG: 4 TABLET ORAL at 21:17

## 2017-04-07 RX ADMIN — IPRATROPIUM BROMIDE AND ALBUTEROL SULFATE SCH ML: .5; 3 SOLUTION RESPIRATORY (INHALATION) at 16:00

## 2017-04-07 RX ADMIN — HYDROCODONE BITARTRATE AND ACETAMINOPHEN PRN TAB: 7.5; 325 TABLET ORAL at 09:11

## 2017-04-07 RX ADMIN — FAMOTIDINE SCH MG: 20 TABLET ORAL at 13:04

## 2017-04-07 RX ADMIN — DULOXETINE HYDROCHLORIDE SCH MG: 30 CAPSULE, DELAYED RELEASE ORAL at 09:03

## 2017-04-07 RX ADMIN — LEVOFLOXACIN SCH MG: 500 TABLET, FILM COATED ORAL at 06:19

## 2017-04-07 RX ADMIN — GABAPENTIN SCH MG: 300 CAPSULE ORAL at 13:04

## 2017-04-07 RX ADMIN — IPRATROPIUM BROMIDE AND ALBUTEROL SULFATE SCH ML: .5; 3 SOLUTION RESPIRATORY (INHALATION) at 18:10

## 2017-04-07 RX ADMIN — ZOLPIDEM TARTRATE PRN MG: 5 TABLET ORAL at 21:17

## 2017-04-07 RX ADMIN — CALCIUM SCH MG: 500 TABLET ORAL at 17:49

## 2017-04-07 RX ADMIN — IPRATROPIUM BROMIDE AND ALBUTEROL SULFATE SCH ML: .5; 3 SOLUTION RESPIRATORY (INHALATION) at 12:46

## 2017-04-07 RX ADMIN — CALCIUM SCH MG: 500 TABLET ORAL at 13:04

## 2017-04-07 RX ADMIN — CLONAZEPAM SCH MG: 0.5 TABLET ORAL at 09:03

## 2017-04-07 RX ADMIN — FUROSEMIDE SCH MG: 40 TABLET ORAL at 09:03

## 2017-04-07 RX ADMIN — GABAPENTIN SCH MG: 300 CAPSULE ORAL at 11:28

## 2017-04-07 RX ADMIN — IPRATROPIUM BROMIDE AND ALBUTEROL SULFATE SCH ML: .5; 3 SOLUTION RESPIRATORY (INHALATION) at 06:10

## 2017-04-07 RX ADMIN — CLONAZEPAM SCH MG: 0.5 TABLET ORAL at 21:18

## 2017-04-07 RX ADMIN — GABAPENTIN SCH MG: 300 CAPSULE ORAL at 09:03

## 2017-04-07 NOTE — PDOC
SUBJECTIVE


Subjective


She still complains of headache, she is on IV steroids for COPD exacerbation 

which is also helping her temporal arteritis/polymyalgia rheumatica, she does 

have neuropathic pain in the lower extremities and still complains of insomnia





OBJECTIVE


Vital Signs





Vital Signs








  Date Time  Temp Pulse Resp B/P Pulse Ox O2 Delivery O2 Flow Rate FiO2


 


4/7/17 10:52 98.3 74 17 156/79 93 Nasal Cannula 2.0 





 98.3       


 


4/7/17 09:04  78  145/68    


 


4/7/17 08:00      Nasal Cannula 2.0 


 


4/7/17 07:16 98.1 78 17 145/68 94 Nasal Cannula 2.0 





 98.1       


 


4/7/17 06:09     96 Nasal Cannula 2.0 


 


4/7/17 04:02 96.2 78 18 127/79 98   





 96.2       


 


4/7/17 03:00 96.0 94  130/85 99 Nasal Cannula 2.0 





 96.0       


 


4/6/17 23:00 96.0 94 20 130/85 99 Room Air  





 96.0       


 


4/6/17 22:54      Nasal Cannula 2.0 


 


4/6/17 21:35      Nasal Cannula 2.0 


 


4/6/17 20:53     98 Nasal Cannula 2.0 


 


4/6/17 20:00      Nasal Cannula 2.0 


 


4/6/17 19:00 97.5 102 18 145/82 95  3.0 





 97.5       


 


4/6/17 16:40 98.2 89 19 159/84 94 Nasal Cannula 3.0 





 98.2       








I & O











 Intake and Output 


 


 4/7/17





 07:00


 


Intake Total 440 ml


 


Output Total 600 ml


 


Balance -160 ml


 


 


 


Intake Oral 440 ml


 


Output Urine Total 600 ml


 


# Voids 2











PHYSICAL EXAM


Physical Exam


Lungs was better air movement


Otherwise her exam without change





ASSESSMENT/PLAN


Assessment/Plan


1.  Shortness of breath with hypoxia, due to chronic obstructive pulmonary


disease exacerbation, no evidence of pulmonary embolus on CT chest, also no 

evidence of DVT on venous Doppler of lower extremities


2.  Previous history of pulmonary nodules, followed by Pulmonary and has not had


any changes over the last several years.


3.  Headache multifactorial, could be related to her polymyalgia/temporal 

arteritis she is normally on prednisone 10 mg at home, currently she is on IV 

Solu-Medrol due to COPD exacerbation, large meningioma could be also 

contributing to her headache


4.  History of polymyalgia rheumatica, on chronic steroids.  She is currently on


IV steroids for  chronic obstructive pulmonary disease.


5.  Chronic obstructive pulmonary disease exacerbation and bronchitis.


6.  Peripheral neuropathy.


7.  Hypertension, hypertensive cardiovascular disease.


8.  Osteoarthritis.


9.  Hypothyroidism.


10. Large intracranial meningioma was edema in the right frontal lobe as per  

MRI


Continue current plan


Problems:  





COMMENT


Lab





Laboratory Tests








Test


  4/7/17


04:23


 


White Blood Count


  11.6x10^3/uL


(4.0-11.0)


 


Red Blood Count


  3.82x10^6/uL


(3.50-5.40)


 


Hemoglobin


  9.9g/dL


(12.0-15.5)


 


Hematocrit


  32.4%


(36.0-47.0)


 


Mean Corpuscular Volume 85fL () 


 


Mean Corpuscular Hemoglobin 26pg (25-35) 


 


Mean Corpuscular Hemoglobin


Concent 31g/dL (31-37) 


 


 


Red Cell Distribution Width


  18.9%


(11.5-14.5)


 


Platelet Count


  306x10^3/uL


(140-400)


 


Erythrocyte Sedimentation Rate 87 (0-25) 


 


Sodium Level


  140mmol/L


(136-145)


 


Potassium Level


  4.4mmol/L


(3.5-5.1)


 


Chloride Level


  101mmol/L


()


 


Carbon Dioxide Level


  35mmol/L


(21-32)


 


Anion Gap 4 (6-14) 


 


Blood Urea Nitrogen 21mg/dL (7-20) 


 


Creatinine


  0.8mg/dL


(0.6-1.0)


 


Estimated GFR


(Cockcroft-Gault) 84.6 


 


 


Glucose Level


  151mg/dL


(70-99)


 


Calcium Level


  9.5mg/dL


(8.5-10.1)














SAIDA MATUTE MD Apr 7, 2017 12:06

## 2017-04-07 NOTE — PDOC
PULMONARY PROGRESS NOTES


Subjective


c/o headache


no SOA


Vitals





 Vital Signs








  Date Time  Temp Pulse Resp B/P Pulse Ox O2 Delivery O2 Flow Rate FiO2


 


4/7/17 09:04  78  145/68    


 


4/7/17 08:00      Nasal Cannula 2.0 


 


4/7/17 07:16 98.1  17  94   





 98.1       








General:  Alert, No acute distress


Lungs:  Clear


Cardiovascular:  S1


Abdomen:  Soft


Neuro Exam:  Alert


Extremities:  Other (mild left lower ext edema)


Skin:  Warm


Labs





Laboratory Tests








Test


  4/5/17


15:55 4/5/17


16:00 4/5/17


16:28 4/7/17


04:23


 


White Blood Count


  8.0x10^3/uL


(4.0-11.0) 


  


  11.6x10^3/uL


(4.0-11.0)


 


Red Blood Count


  4.20x10^6/uL


(3.50-5.40) 


  


  3.82x10^6/uL


(3.50-5.40)


 


Hemoglobin


  11.2g/dL


(12.0-15.5) 


  


  9.9g/dL


(12.0-15.5)


 


Hematocrit


  35.9%


(36.0-47.0) 


  


  32.4%


(36.0-47.0)


 


Mean Corpuscular Volume 86fL ()    85fL () 


 


Mean Corpuscular Hemoglobin 27pg (25-35)    26pg (25-35) 


 


Mean Corpuscular Hemoglobin


Concent 31g/dL (31-37) 


  


  


  31g/dL (31-37) 


 


 


Red Cell Distribution Width


  19.2%


(11.5-14.5) 


  


  18.9%


(11.5-14.5)


 


Platelet Count


  298x10^3/uL


(140-400) 


  


  306x10^3/uL


(140-400)


 


Neutrophils (%) (Auto) 64% (31-73)    


 


Lymphocytes (%) (Auto) 26% (24-48)    


 


Monocytes (%) (Auto) 8% (0-9)    


 


Eosinophils (%) (Auto) 1% (0-3)    


 


Basophils (%) (Auto) 1% (0-3)    


 


Neutrophils # (Auto)


  5.2x10^3uL


(1.8-7.7) 


  


  


 


 


Lymphocytes # (Auto)


  2.1x10^3/uL


(1.0-4.8) 


  


  


 


 


Monocytes # (Auto)


  0.6x10^3/uL


(0.0-1.1) 


  


  


 


 


Eosinophils # (Auto)


  0.1x10^3/uL


(0.0-0.7) 


  


  


 


 


Basophils # (Auto)


  0.1x10^3/uL


(0.0-0.2) 


  


  


 


 


Sodium Level


  148mmol/L


(136-145) 


  


  140mmol/L


(136-145)


 


Potassium Level


  3.7mmol/L


(3.5-5.1) 


  


  4.4mmol/L


(3.5-5.1)


 


Chloride Level


  107mmol/L


() 


  


  101mmol/L


()


 


Carbon Dioxide Level


  33mmol/L


(21-32) 


  


  35mmol/L


(21-32)


 


Anion Gap 8 (6-14)    4 (6-14) 


 


Blood Urea Nitrogen 9mg/dL (7-20)    21mg/dL (7-20) 


 


Creatinine


  0.7mg/dL


(0.6-1.0) 


  


  0.8mg/dL


(0.6-1.0)


 


Estimated GFR


(Cockcroft-Gault) 98.7 


  


  


  84.6 


 


 


BUN/Creatinine Ratio 13 (6-20)    


 


Glucose Level


  92mg/dL


(70-99) 


  


  151mg/dL


(70-99)


 


Calcium Level


  9.8mg/dL


(8.5-10.1) 


  


  9.5mg/dL


(8.5-10.1)


 


Total Bilirubin


  0.3mg/dL


(0.2-1.0) 


  


  


 


 


Aspartate Amino Transf


(AST/SGOT) 16U/L (15-37) 


  


  


  


 


 


Alanine Aminotransferase


(ALT/SGPT) 13U/L (14-59) 


  


  


  


 


 


Alkaline Phosphatase 86U/L ()    


 


Troponin I Quantitative


  < 0.017ng/mL


(0.000-0.055) 


  


  


 


 


NT-Pro-B-Type Natriuretic


Peptide 116pg/mL


(0-449) 


  


  


 


 


Total Protein


  8.5g/dL


(6.4-8.2) 


  


  


 


 


Albumin


  3.6g/dL


(3.4-5.0) 


  


  


 


 


Albumin/Globulin Ratio 0.7 (1.0-1.7)    


 


O2 Saturation  94% (92-99)   


 


Arterial Blood pH


  


  7.34


(7.35-7.45) 


  


 


 


Arterial Blood pCO2 at


Patient Temp 


  57mmHg (35-46) 


  


  


 


 


Arterial Blood pO2 at Patient


Temp 


  79mmHg


() 


  


 


 


Arterial Blood HCO3


  


  30mmol/L


(21-28) 


  


 


 


Arterial Blood Base Excess  4mmol/L (-3-3)   


 


FiO2  32.0   


 


Influenza Type A Antigen


  


  


  Negative


(NEGATIVE) 


 


 


Influenza Type B Antigen


  


  


  Negative


(NEGATIVE) 


 


 


Erythrocyte Sedimentation Rate    87 (0-25) 








Laboratory Tests








Test


  4/7/17


04:23


 


White Blood Count


  11.6x10^3/uL


(4.0-11.0)


 


Red Blood Count


  3.82x10^6/uL


(3.50-5.40)


 


Hemoglobin


  9.9g/dL


(12.0-15.5)


 


Hematocrit


  32.4%


(36.0-47.0)


 


Mean Corpuscular Volume 85fL () 


 


Mean Corpuscular Hemoglobin 26pg (25-35) 


 


Mean Corpuscular Hemoglobin


Concent 31g/dL (31-37) 


 


 


Red Cell Distribution Width


  18.9%


(11.5-14.5)


 


Platelet Count


  306x10^3/uL


(140-400)


 


Erythrocyte Sedimentation Rate 87 (0-25) 


 


Sodium Level


  140mmol/L


(136-145)


 


Potassium Level


  4.4mmol/L


(3.5-5.1)


 


Chloride Level


  101mmol/L


()


 


Carbon Dioxide Level


  35mmol/L


(21-32)


 


Anion Gap 4 (6-14) 


 


Blood Urea Nitrogen 21mg/dL (7-20) 


 


Creatinine


  0.8mg/dL


(0.6-1.0)


 


Estimated GFR


(Cockcroft-Gault) 84.6 


 


 


Glucose Level


  151mg/dL


(70-99)


 


Calcium Level


  9.5mg/dL


(8.5-10.1)








Medications





Active Scripts








 Medications  Dose


 Route/Sig Days Date Category


 


 Tylenol


  (Acetaminophen)


 325 Mg Tablet  2 Tab


 PO PRN Q4HRS   4/5/17 Reported


 


 Hydrocodone-Apap


 7.5-325  **


  (Hydrocodone


 Bit/Acetaminophen)


 1 Each Tablet  1 Tab


 PO Q8HRS PRN   4/5/17 Reported


 


 Tizanidine Hcl 4


 Mg Tablet  


    4/5/17 Reported


 


 Zolpidem Tartrate


 5 Mg Tablet  


    4/5/17 Reported


 


 Furosemide 40 Mg


 Tablet  


    4/5/17 Reported


 


 Benazepril Hcl 40


 Mg Tablet  


    4/5/17 Reported


 


 Amlodipine


 Besylate 10 Mg


 Tablet  


    4/5/17 Reported








Comments


CT CHEST


1. No CT evidence of central pulmonary emboli.


2. Moderate coronary artery calcifications.


3. Emphysema.


4. Stable 1 cm right middle lobe pulmonary nodule.


5. New mild scattered tree-in-bud and nodular pulmonary opacities. The


findings suggest infection and/or small airway disease. A neoplastic etiology


is less likely. CT follow-up is suggested.





Impression


.


1.  Acute on chronic hypercapnic respiratory failure secondary to chronic


obstructive pulmonary disease exacerbation and mild patchy pneumonitis.


2.  History of left lower extremity DVT, treated 5 years ago with Coumadin.  Now


comes in with some swelling in the left lower extremity and some chest pain.  CT


angiogram with no definite pulmonary embolism. no recurrent DVT


3.  Mild patchy pneumonitis/ stable right lung nodule.


4.  History of polymyalgia rheumatica, on chronic steroids.


5.  Abnormal ct chest with Stable 1 cm right middle lobe pulmonary nodule.


   New mild scattered tree-in-bud and nodular pulmonary opacities. The


findings suggest infection and/or small airway disease. A neoplastic etiology


is less likely.





Plan


.





1.  Continue with present nebulizer treatment.


2.  Continue with oxygen.


3.   follow up ABGs.as needed


4.  Empiric antibiotics.


5.  IV Solu-Medrol.


6.  Venous Dopplers of left lower extremity.negative


7.  Follow up ct chest in 4-6 weeks








JAMI ACHARYA MD Apr 7, 2017 10:10

## 2017-04-07 NOTE — PDOC
PROGRESS NOTES


Assessment


 Problems


Medical Problems:


(1) COPD with acute exacerbation


Status: Acute  





Temporal arteritis, ESR 87


Fibromyalgia


Meningioma


Plan


She was on Solu-Medrol for her COPD, I will continue as oral prednisone


Left temporal artery biopsy.


Continued radiologic observation of the meningioma which I do not think is the 

cause of the headaches


I discussed all risks, benefits, alternatives, side effects with the patient 

and her daughter.


I increased the zolpidem dose


Subjective


Complains of insomnia and left temporal pain


Objective





 Vital Signs








  Date Time  Temp Pulse Resp B/P Pulse Ox O2 Delivery O2 Flow Rate FiO2


 


4/7/17 10:52 98.3 74 17 156/79 93 Nasal Cannula 2.0 





 98.3       














 Intake and Output 


 


 4/7/17





 07:00


 


Intake Total 440 ml


 


Output Total 600 ml


 


Balance -160 ml


 


 


 


Intake Oral 440 ml


 


Output Urine Total 600 ml


 


# Voids 2








PHYSICAL EXAM


She is tender over the left temporal artery


Alert. Oriented to time, place and person.


PERRL.


EOMI.


CN: no focal findings.


Fundi benign


Muscle tone: normal.


Muscle strength: 5/5


DTR: 2+


Plantar reflex: Flexor


Gait: not examined in bed.


Sensory exam: no abnormal findings.


No cerebellar signs elicited.


Review of Relevant


I have reviewed the following items unique (where applicable) has been applied.


Labs





Laboratory Tests








Test


  4/5/17


15:55 4/5/17


16:00 4/5/17


16:28 4/7/17


04:23


 


White Blood Count


  8.0x10^3/uL


(4.0-11.0) 


  


  11.6x10^3/uL


(4.0-11.0)


 


Red Blood Count


  4.20x10^6/uL


(3.50-5.40) 


  


  3.82x10^6/uL


(3.50-5.40)


 


Hemoglobin


  11.2g/dL


(12.0-15.5) 


  


  9.9g/dL


(12.0-15.5)


 


Hematocrit


  35.9%


(36.0-47.0) 


  


  32.4%


(36.0-47.0)


 


Mean Corpuscular Volume 86fL ()    85fL () 


 


Mean Corpuscular Hemoglobin 27pg (25-35)    26pg (25-35) 


 


Mean Corpuscular Hemoglobin


Concent 31g/dL (31-37) 


  


  


  31g/dL (31-37) 


 


 


Red Cell Distribution Width


  19.2%


(11.5-14.5) 


  


  18.9%


(11.5-14.5)


 


Platelet Count


  298x10^3/uL


(140-400) 


  


  306x10^3/uL


(140-400)


 


Neutrophils (%) (Auto) 64% (31-73)    


 


Lymphocytes (%) (Auto) 26% (24-48)    


 


Monocytes (%) (Auto) 8% (0-9)    


 


Eosinophils (%) (Auto) 1% (0-3)    


 


Basophils (%) (Auto) 1% (0-3)    


 


Neutrophils # (Auto)


  5.2x10^3uL


(1.8-7.7) 


  


  


 


 


Lymphocytes # (Auto)


  2.1x10^3/uL


(1.0-4.8) 


  


  


 


 


Monocytes # (Auto)


  0.6x10^3/uL


(0.0-1.1) 


  


  


 


 


Eosinophils # (Auto)


  0.1x10^3/uL


(0.0-0.7) 


  


  


 


 


Basophils # (Auto)


  0.1x10^3/uL


(0.0-0.2) 


  


  


 


 


Sodium Level


  148mmol/L


(136-145) 


  


  140mmol/L


(136-145)


 


Potassium Level


  3.7mmol/L


(3.5-5.1) 


  


  4.4mmol/L


(3.5-5.1)


 


Chloride Level


  107mmol/L


() 


  


  101mmol/L


()


 


Carbon Dioxide Level


  33mmol/L


(21-32) 


  


  35mmol/L


(21-32)


 


Anion Gap 8 (6-14)    4 (6-14) 


 


Blood Urea Nitrogen 9mg/dL (7-20)    21mg/dL (7-20) 


 


Creatinine


  0.7mg/dL


(0.6-1.0) 


  


  0.8mg/dL


(0.6-1.0)


 


Estimated GFR


(Cockcroft-Gault) 98.7 


  


  


  84.6 


 


 


BUN/Creatinine Ratio 13 (6-20)    


 


Glucose Level


  92mg/dL


(70-99) 


  


  151mg/dL


(70-99)


 


Calcium Level


  9.8mg/dL


(8.5-10.1) 


  


  9.5mg/dL


(8.5-10.1)


 


Total Bilirubin


  0.3mg/dL


(0.2-1.0) 


  


  


 


 


Aspartate Amino Transf


(AST/SGOT) 16U/L (15-37) 


  


  


  


 


 


Alanine Aminotransferase


(ALT/SGPT) 13U/L (14-59) 


  


  


  


 


 


Alkaline Phosphatase 86U/L ()    


 


Troponin I Quantitative


  < 0.017ng/mL


(0.000-0.055) 


  


  


 


 


NT-Pro-B-Type Natriuretic


Peptide 116pg/mL


(0-449) 


  


  


 


 


Total Protein


  8.5g/dL


(6.4-8.2) 


  


  


 


 


Albumin


  3.6g/dL


(3.4-5.0) 


  


  


 


 


Albumin/Globulin Ratio 0.7 (1.0-1.7)    


 


O2 Saturation  94% (92-99)   


 


Arterial Blood pH


  


  7.34


(7.35-7.45) 


  


 


 


Arterial Blood pCO2 at


Patient Temp 


  57mmHg (35-46) 


  


  


 


 


Arterial Blood pO2 at Patient


Temp 


  79mmHg


() 


  


 


 


Arterial Blood HCO3


  


  30mmol/L


(21-28) 


  


 


 


Arterial Blood Base Excess  4mmol/L (-3-3)   


 


FiO2  32.0   


 


Influenza Type A Antigen


  


  


  Negative


(NEGATIVE) 


 


 


Influenza Type B Antigen


  


  


  Negative


(NEGATIVE) 


 


 


Erythrocyte Sedimentation Rate    87 (0-25) 








Laboratory Tests








Test


  4/7/17


04:23


 


White Blood Count


  11.6x10^3/uL


(4.0-11.0)


 


Red Blood Count


  3.82x10^6/uL


(3.50-5.40)


 


Hemoglobin


  9.9g/dL


(12.0-15.5)


 


Hematocrit


  32.4%


(36.0-47.0)


 


Mean Corpuscular Volume 85fL () 


 


Mean Corpuscular Hemoglobin 26pg (25-35) 


 


Mean Corpuscular Hemoglobin


Concent 31g/dL (31-37) 


 


 


Red Cell Distribution Width


  18.9%


(11.5-14.5)


 


Platelet Count


  306x10^3/uL


(140-400)


 


Erythrocyte Sedimentation Rate 87 (0-25) 


 


Sodium Level


  140mmol/L


(136-145)


 


Potassium Level


  4.4mmol/L


(3.5-5.1)


 


Chloride Level


  101mmol/L


()


 


Carbon Dioxide Level


  35mmol/L


(21-32)


 


Anion Gap 4 (6-14) 


 


Blood Urea Nitrogen 21mg/dL (7-20) 


 


Creatinine


  0.8mg/dL


(0.6-1.0)


 


Estimated GFR


(Cockcroft-Gault) 84.6 


 


 


Glucose Level


  151mg/dL


(70-99)


 


Calcium Level


  9.5mg/dL


(8.5-10.1)








Medications





 Current Medications


Albuterol/ Ipratropium (Duoneb) 3 ml 1X  ONCE NEB  Last administered on 4/5/ 17at 16:33;  Start 4/5/17 at 16:45;  Stop 4/5/17 at 16:46;  Status DC


Methylprednisolone Sodium Succinate (Solu-Medrol 125mg Vial) 80 mg 1X  ONCE IV  

Last administered on 4/5/17at 17:25;  Start 4/5/17 at 16:45;  Stop 4/5/17 at 16:

46;  Status DC


Albuterol Sulfate (Ventolin Neb Soln) 10 mg 1X  ONCE CONT NEB  Last 

administered on 4/5/17at 16:33;  Start 4/5/17 at 17:00;  Stop 4/5/17 at 17:01;  

Status DC


Fentanyl Citrate (Fentanyl 2ml Vial) 50 mcg 1X  ONCE IV  Last administered on 4/ 5/17at 17:25;  Start 4/5/17 at 17:00;  Stop 4/5/17 at 17:01;  Status DC


Furosemide (Lasix) 40 mg 1X  ONCE IVP  Last administered on 4/5/17at 19:13;  

Start 4/5/17 at 17:45;  Stop 4/5/17 at 17:46;  Status DC


Albuterol/ Ipratropium (Duoneb) 3 ml RTQID NEB  Last administered on 4/6/17at 11

:12;  Start 4/5/17 at 20:00;  Stop 4/6/17 at 12:44;  Status DC


Methylprednisolone Sodium Succinate (Solu-Medrol 125mg Vial) 125 mg Q12HR IV  

Last administered on 4/7/17at 09:02;  Start 4/5/17 at 21:00;  Stop 4/7/17 at 10:

11;  Status DC


Albuterol/ Ipratropium (Duoneb) 3 ml RTQID NEB  Last administered on 4/7/17at 06

:10;  Start 4/6/17 at 08:00


Acetaminophen (Tylenol) 650 mg PRN Q4HRS  PRN PO MILD PAIN;  Start 4/5/17 at 20:

30


Amlodipine Besylate (Norvasc) 10 mg DAILY08 PO  Last administered on 4/7/17at 09

:04;  Start 4/6/17 at 08:00


Furosemide (Lasix) 40 mg DAILY08 PO  Last administered on 4/7/17at 09:03;  

Start 4/6/17 at 08:00


Acetaminophen/ Hydrocodone Bitart (Lortab 7.5/325) 1 tab PRN Q8HRS  PRN PO 

MODERATE - SEVERE PAIN Last administered on 4/7/17at 09:11;  Start 4/5/17 at 20:

30


Tizanidine HCl (Zanaflex) 4 mg PRN Q8HRS  PRN PO MUSCLE SPASMS Last 

administered on 4/5/17at 21:42;  Start 4/5/17 at 20:30


Zolpidem Tartrate (Ambien) 5 mg QHS  PRN PO SLEEP Last administered on 4/6/17at 

21:34;  Start 4/5/17 at 21:00


Clonazepam (Klonopin) 0.5 mg BID PO  Last administered on 4/7/17at 09:03;  

Start 4/6/17 at 10:00


Gabapentin (Neurontin) 300 mg TID PO  Last administered on 4/7/17at 09:03;  

Start 4/6/17 at 10:00


Duloxetine HCl (Cymbalta) 60 mg DAILY PO  Last administered on 4/7/17at 09:03;  

Start 4/6/17 at 10:00


Iohexol (Omnipaque 300 Mg/ml) 75 ml 1X  ONCE IV  Last administered on 4/6/17at 

10:14;  Start 4/6/17 at 10:00;  Stop 4/6/17 at 10:01;  Status DC


Info (Do NOT chart on this entry -- for MONITORING) 1 each PRN DAILY  PRN MC 

SEE COMMENTS;  Start 4/6/17 at 10:00;  Stop 4/8/17 at 09:59


Levofloxacin (Levaquin) 500 mg DAILY06 PO  Last administered on 4/7/17at 06:19;

  Start 4/6/17 at 13:00


Diazepam (Valium) 10 mg 1X  ONCE IV  Last administered on 4/6/17at 14:10;  

Start 4/6/17 at 14:15;  Stop 4/6/17 at 14:16;  Status DC


Gadobutrol (Gadavist) 7.5 mmol 1X  ONCE IV  Last administered on 4/6/17at 15:09

;  Start 4/6/17 at 14:45;  Stop 4/6/17 at 14:46;  Status DC


Ketorolac Tromethamine (Toradol) 30 mg 1X  ONCE IV  Last administered on 4/6/ 17at 15:51;  Start 4/6/17 at 16:00;  Stop 4/6/17 at 16:01;  Status DC


Prednisone (Prednisone) 60 mg DAILY PO ;  Start 4/8/17 at 09:00





Active Scripts


Active


Reported


Tylenol (Acetaminophen) 325 Mg Tablet 2 Tab PO PRN Q4HRS


Hydrocodone-Apap 7.5-325  ** (Hydrocodone Bit/Acetaminophen) 1 Each Tablet 1 

Tab PO Q8HRS PRN


Tizanidine Hcl 4 Mg Tablet   


Zolpidem Tartrate 5 Mg Tablet   


Furosemide 40 Mg Tablet   


Benazepril Hcl 40 Mg Tablet   


Amlodipine Besylate 10 Mg Tablet


Vitals/I & O





 Vital Sign - Last 24 Hours








 4/6/17 4/6/17 4/6/17 4/6/17





 16:40 19:00 20:00 20:53


 


Temp 98.2 97.5  





 98.2 97.5  


 


Pulse 89 102  


 


Resp 19 18  


 


B/P 159/84 145/82  


 


Pulse Ox 94 95  98


 


O2 Delivery Nasal Cannula  Nasal Cannula Nasal Cannula


 


O2 Flow Rate 3.0 3.0 2.0 2.0


 


    





    





 4/6/17 4/6/17 4/6/17 4/7/17





 21:35 22:54 23:00 03:00


 


Temp   96.0 96.0





   96.0 96.0


 


Pulse   94 94


 


Resp   20 


 


B/P   130/85 130/85


 


Pulse Ox   99 99


 


O2 Delivery Nasal Cannula Nasal Cannula Room Air Nasal Cannula


 


O2 Flow Rate 2.0 2.0  2.0


 


    





    





 4/7/17 4/7/17 4/7/17 4/7/17





 04:02 06:09 07:16 08:00


 


Temp 96.2  98.1 





 96.2  98.1 


 


Pulse 78  78 


 


Resp 18  17 


 


B/P 127/79  145/68 


 


Pulse Ox 98 96 94 


 


O2 Delivery  Nasal Cannula Nasal Cannula Nasal Cannula


 


O2 Flow Rate  2.0 2.0 2.0


 


    





    





 4/7/17 4/7/17  





 09:04 10:52  


 


Temp  98.3  





  98.3  


 


Pulse 78 74  


 


Resp  17  


 


B/P 145/68 156/79  


 


Pulse Ox  93  


 


O2 Delivery  Nasal Cannula  


 


O2 Flow Rate  2.0  














 Intake and Output   


 


 4/6/17 4/6/17 4/7/17





 15:00 23:00 07:00


 


Intake Total  440 ml 0 ml


 


Output Total  600 ml 


 


Balance  -160 ml 0 ml








Images


There is enhancing extra-axial mass of the floor of the anterior cranial 


fossa with bilateral parafalcine extent, extending along the planum 


sphenoidale and cribriform plate, also extent to the suprasellar cistern 


and contacting the superior aspect of the pituitary gland. There is also 


contact and mild displacement posteriorly of the infundibulum. Pre 


chiasmatic optic nerves are apparently displaced inferiorly although 


poorly distinguished on this exam. Mass measures up to 3.9 cm AP by 2.6 cm


cc by 3.4 cm transverse. Mass measures slightly larger on the order of 0.2


cm in the transverse and AP planes. Masses seen along the anterior 


surfaces of the anterior cerebral arteries. There is new T2 and FLAIR 


hyperintense signal abnormality of the adjacent right frontal lobe. There 


is small focus of encephalomalacia with cortical involvement of the right 


frontal lobe as seen previously. 


No new parenchymal enhancement is identified. There is again absence of 


the right internal carotid artery flow void. Ventricular size is 


proportionate to the sulcal spaces, lateral ventricles very slightly 


larger although may be due to atrophy. There is no midline shift or 


extra-axial fluid collection. Mild T2 and FLAIR hyperintense signal 


abnormality of the sofie is greater in interval. There is mild mucosal 


thickening of the ethmoid air cells and sphenoid sinus. There has been 


lens surgery on the right in interval. 


 


IMPRESSION 


1. There is again a large extra-axial enhancing mass compatible with 


meningioma of the floor of the anterior cranial fossa, extends to the 


suprasellar cistern and contacts pituitary infundibulum as well as 


displaces the optic nerves inferiorly. Mass is very slightly larger on the


order of 0.2 cm in AP and transverse planes. There has been development 


since the previous exam of gliosis or edema of the right frontal lobe 


adjacent to the mass.


2. 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


absence of flow void.


3. There is again focus of encephalomalacia with cortical involving the 


right frontal lobe. Mild T2 and FLAIR hyperintense signal abnormality of 


the sofie is greater, probably due to chronic microvascular ischemic 


disease.








APPELBAUM,ADRIANO S MD Apr 7, 2017 11:36

## 2017-04-07 NOTE — PDOC2
CONSULT


Date of Consult


Date of Consult


DATE: 4/7/17 


TIME: 12:56





Reason for Consult


Reason for Consult:


left temporal artery biopsy





Referring Physician


Referring Physician:


Dr Hines





Identification/Chief Complaint


Chief Complaint


headaches





Source


Source:  Chart review, Patient





History of Present Illness


Reason for Visit:


Ms Duong is a 75 yo lady admitted for an exacerbation of her COPD. She was seen 

in consultation by Dr Hines for headaches. found to have an elevated ESR. 

We are asked to do temporal artery biopsy





Past Medical History


Cardiovascular:  HTN


Pulmonary:  COPD


CENTRAL NERVOUS SYSTEM:  Periperal neuropathy, Other (meningioma, treated with 

radiation treatments)


Musculoskeletal:  Osteoarthritis


Rheumatologic:  Fibromyalgia


Endocrine:  Hypothyroidism





Past Surgical History


Past Surgical History:  No pertinent history





Family History


Family History:  Coronary Artery Disease, Hypertension





Social History


<1 pack per day


ALCOHOL:  occassional


Lives:  with Family





Current Problem List


Problem List


 Problems


Medical Problems:


(1) COPD with acute exacerbation


Status: Acute  











Current Medications


Current Medications





 Current Medications


Albuterol/ Ipratropium (Duoneb) 3 ml 1X  ONCE NEB  Last administered on 4/5/ 17at 16:33;  Start 4/5/17 at 16:45;  Stop 4/5/17 at 16:46;  Status DC


Methylprednisolone Sodium Succinate (Solu-Medrol 125mg Vial) 80 mg 1X  ONCE IV  

Last administered on 4/5/17at 17:25;  Start 4/5/17 at 16:45;  Stop 4/5/17 at 16:

46;  Status DC


Albuterol Sulfate (Ventolin Neb Soln) 10 mg 1X  ONCE CONT NEB  Last 

administered on 4/5/17at 16:33;  Start 4/5/17 at 17:00;  Stop 4/5/17 at 17:01;  

Status DC


Fentanyl Citrate (Fentanyl 2ml Vial) 50 mcg 1X  ONCE IV  Last administered on 4/ 5/17at 17:25;  Start 4/5/17 at 17:00;  Stop 4/5/17 at 17:01;  Status DC


Furosemide (Lasix) 40 mg 1X  ONCE IVP  Last administered on 4/5/17at 19:13;  

Start 4/5/17 at 17:45;  Stop 4/5/17 at 17:46;  Status DC


Albuterol/ Ipratropium (Duoneb) 3 ml RTQID NEB  Last administered on 4/6/17at 11

:12;  Start 4/5/17 at 20:00;  Stop 4/6/17 at 12:44;  Status DC


Methylprednisolone Sodium Succinate (Solu-Medrol 125mg Vial) 125 mg Q12HR IV  

Last administered on 4/7/17at 09:02;  Start 4/5/17 at 21:00;  Stop 4/7/17 at 10:

11;  Status DC


Albuterol/ Ipratropium (Duoneb) 3 ml RTQID NEB  Last administered on 4/7/17at 12

:46;  Start 4/6/17 at 08:00


Acetaminophen (Tylenol) 650 mg PRN Q4HRS  PRN PO MILD PAIN;  Start 4/5/17 at 20:

30


Amlodipine Besylate (Norvasc) 10 mg DAILY08 PO  Last administered on 4/7/17at 09

:04;  Start 4/6/17 at 08:00


Furosemide (Lasix) 40 mg DAILY08 PO  Last administered on 4/7/17at 09:03;  

Start 4/6/17 at 08:00


Acetaminophen/ Hydrocodone Bitart (Lortab 7.5/325) 1 tab PRN Q8HRS  PRN PO 

MODERATE - SEVERE PAIN Last administered on 4/7/17at 09:11;  Start 4/5/17 at 20:

30


Tizanidine HCl (Zanaflex) 4 mg PRN Q8HRS  PRN PO MUSCLE SPASMS Last 

administered on 4/5/17at 21:42;  Start 4/5/17 at 20:30


Zolpidem Tartrate (Ambien) 5 mg QHS  PRN PO SLEEP Last administered on 4/6/17at 

21:34;  Start 4/5/17 at 21:00;  Stop 4/7/17 at 11:34;  Status DC


Clonazepam (Klonopin) 0.5 mg BID PO  Last administered on 4/7/17at 09:03;  

Start 4/6/17 at 10:00


Gabapentin (Neurontin) 300 mg TID PO  Last administered on 4/7/17at 09:03;  

Start 4/6/17 at 10:00


Duloxetine HCl (Cymbalta) 60 mg DAILY PO  Last administered on 4/7/17at 09:03;  

Start 4/6/17 at 10:00


Iohexol (Omnipaque 300 Mg/ml) 75 ml 1X  ONCE IV  Last administered on 4/6/17at 

10:14;  Start 4/6/17 at 10:00;  Stop 4/6/17 at 10:01;  Status DC


Info (Do NOT chart on this entry -- for MONITORING) 1 each PRN DAILY  PRN MC 

SEE COMMENTS;  Start 4/6/17 at 10:00;  Stop 4/8/17 at 09:59


Levofloxacin (Levaquin) 500 mg DAILY06 PO  Last administered on 4/7/17at 06:19;

  Start 4/6/17 at 13:00


Diazepam (Valium) 10 mg 1X  ONCE IV  Last administered on 4/6/17at 14:10;  

Start 4/6/17 at 14:15;  Stop 4/6/17 at 14:16;  Status DC


Gadobutrol (Gadavist) 7.5 mmol 1X  ONCE IV  Last administered on 4/6/17at 15:09

;  Start 4/6/17 at 14:45;  Stop 4/6/17 at 14:46;  Status DC


Ketorolac Tromethamine (Toradol) 30 mg 1X  ONCE IV  Last administered on 4/6/ 17at 15:51;  Start 4/6/17 at 16:00;  Stop 4/6/17 at 16:01;  Status DC


Prednisone (Prednisone) 60 mg DAILY PO ;  Start 4/8/17 at 09:00


Zolpidem Tartrate (Ambien) 10 mg PRN QHS  PRN PO SLEEP;  Start 4/7/17 at 11:45


Famotidine (Pepcid) 20 mg DAILY PO ;  Start 4/7/17 at 12:00


Calcium Carbonate/ Glycine (Oscal) 500 mg TIDAFTMEAL PO ;  Start 4/7/17 at 13:00


Vitamin D (Vitamin D3) 50,000 unit QMTH PO ;  Start 4/10/17 at 16:00





Active Scripts


Active


Reported


Tylenol (Acetaminophen) 325 Mg Tablet 2 Tab PO PRN Q4HRS


Hydrocodone-Apap 7.5-325  ** (Hydrocodone Bit/Acetaminophen) 1 Each Tablet 1 

Tab PO Q8HRS PRN


Tizanidine Hcl 4 Mg Tablet   


Zolpidem Tartrate 5 Mg Tablet   


Furosemide 40 Mg Tablet   


Benazepril Hcl 40 Mg Tablet   


Amlodipine Besylate 10 Mg Tablet





Allergies


Allergies:  


Coded Allergies:  


     Penicillins (Verified  Allergy, Intermediate, Rash, 4/5/17)


     ibuprofen (Verified  Allergy, Intermediate, rash, 3/13/15)





ROS


Respiratory:  YES: Shortness of breath


Neurological:  Yes Headaches





Physical Exam


General:  Alert, No acute distress


HEENT:  Atraumatic


Lungs:  Normal air movement


Heart:  Regular rate


Abdomen:  Soft


Extremities:  No clubbing


Neuro:  Normal speech





Vitals


VITALS





 Vital Signs








  Date Time  Temp Pulse Resp B/P Pulse Ox O2 Delivery O2 Flow Rate FiO2


 


4/7/17 12:48     94 Nasal Cannula 2.0 


 


4/7/17 10:52 98.3 74 17 156/79    





 98.3       











Labs


Labs





Laboratory Tests








Test


  4/5/17


15:55 4/5/17


16:00 4/5/17


16:28 4/7/17


04:23


 


White Blood Count


  8.0x10^3/uL


(4.0-11.0) 


  


  11.6x10^3/uL


(4.0-11.0)


 


Red Blood Count


  4.20x10^6/uL


(3.50-5.40) 


  


  3.82x10^6/uL


(3.50-5.40)


 


Hemoglobin


  11.2g/dL


(12.0-15.5) 


  


  9.9g/dL


(12.0-15.5)


 


Hematocrit


  35.9%


(36.0-47.0) 


  


  32.4%


(36.0-47.0)


 


Mean Corpuscular Volume 86fL ()    85fL () 


 


Mean Corpuscular Hemoglobin 27pg (25-35)    26pg (25-35) 


 


Mean Corpuscular Hemoglobin


Concent 31g/dL (31-37) 


  


  


  31g/dL (31-37) 


 


 


Red Cell Distribution Width


  19.2%


(11.5-14.5) 


  


  18.9%


(11.5-14.5)


 


Platelet Count


  298x10^3/uL


(140-400) 


  


  306x10^3/uL


(140-400)


 


Neutrophils (%) (Auto) 64% (31-73)    


 


Lymphocytes (%) (Auto) 26% (24-48)    


 


Monocytes (%) (Auto) 8% (0-9)    


 


Eosinophils (%) (Auto) 1% (0-3)    


 


Basophils (%) (Auto) 1% (0-3)    


 


Neutrophils # (Auto)


  5.2x10^3uL


(1.8-7.7) 


  


  


 


 


Lymphocytes # (Auto)


  2.1x10^3/uL


(1.0-4.8) 


  


  


 


 


Monocytes # (Auto)


  0.6x10^3/uL


(0.0-1.1) 


  


  


 


 


Eosinophils # (Auto)


  0.1x10^3/uL


(0.0-0.7) 


  


  


 


 


Basophils # (Auto)


  0.1x10^3/uL


(0.0-0.2) 


  


  


 


 


Sodium Level


  148mmol/L


(136-145) 


  


  140mmol/L


(136-145)


 


Potassium Level


  3.7mmol/L


(3.5-5.1) 


  


  4.4mmol/L


(3.5-5.1)


 


Chloride Level


  107mmol/L


() 


  


  101mmol/L


()


 


Carbon Dioxide Level


  33mmol/L


(21-32) 


  


  35mmol/L


(21-32)


 


Anion Gap 8 (6-14)    4 (6-14) 


 


Blood Urea Nitrogen 9mg/dL (7-20)    21mg/dL (7-20) 


 


Creatinine


  0.7mg/dL


(0.6-1.0) 


  


  0.8mg/dL


(0.6-1.0)


 


Estimated GFR


(Cockcroft-Gault) 98.7 


  


  


  84.6 


 


 


BUN/Creatinine Ratio 13 (6-20)    


 


Glucose Level


  92mg/dL


(70-99) 


  


  151mg/dL


(70-99)


 


Calcium Level


  9.8mg/dL


(8.5-10.1) 


  


  9.5mg/dL


(8.5-10.1)


 


Total Bilirubin


  0.3mg/dL


(0.2-1.0) 


  


  


 


 


Aspartate Amino Transf


(AST/SGOT) 16U/L (15-37) 


  


  


  


 


 


Alanine Aminotransferase


(ALT/SGPT) 13U/L (14-59) 


  


  


  


 


 


Alkaline Phosphatase 86U/L ()    


 


Troponin I Quantitative


  < 0.017ng/mL


(0.000-0.055) 


  


  


 


 


NT-Pro-B-Type Natriuretic


Peptide 116pg/mL


(0-449) 


  


  


 


 


Total Protein


  8.5g/dL


(6.4-8.2) 


  


  


 


 


Albumin


  3.6g/dL


(3.4-5.0) 


  


  


 


 


Albumin/Globulin Ratio 0.7 (1.0-1.7)    


 


O2 Saturation  94% (92-99)   


 


Arterial Blood pH


  


  7.34


(7.35-7.45) 


  


 


 


Arterial Blood pCO2 at


Patient Temp 


  57mmHg (35-46) 


  


  


 


 


Arterial Blood pO2 at Patient


Temp 


  79mmHg


() 


  


 


 


Arterial Blood HCO3


  


  30mmol/L


(21-28) 


  


 


 


Arterial Blood Base Excess  4mmol/L (-3-3)   


 


FiO2  32.0   


 


Influenza Type A Antigen


  


  


  Negative


(NEGATIVE) 


 


 


Influenza Type B Antigen


  


  


  Negative


(NEGATIVE) 


 


 


Erythrocyte Sedimentation Rate    87 (0-25) 








Laboratory Tests








Test


  4/7/17


04:23


 


White Blood Count


  11.6x10^3/uL


(4.0-11.0)


 


Red Blood Count


  3.82x10^6/uL


(3.50-5.40)


 


Hemoglobin


  9.9g/dL


(12.0-15.5)


 


Hematocrit


  32.4%


(36.0-47.0)


 


Mean Corpuscular Volume 85fL () 


 


Mean Corpuscular Hemoglobin 26pg (25-35) 


 


Mean Corpuscular Hemoglobin


Concent 31g/dL (31-37) 


 


 


Red Cell Distribution Width


  18.9%


(11.5-14.5)


 


Platelet Count


  306x10^3/uL


(140-400)


 


Erythrocyte Sedimentation Rate 87 (0-25) 


 


Sodium Level


  140mmol/L


(136-145)


 


Potassium Level


  4.4mmol/L


(3.5-5.1)


 


Chloride Level


  101mmol/L


()


 


Carbon Dioxide Level


  35mmol/L


(21-32)


 


Anion Gap 4 (6-14) 


 


Blood Urea Nitrogen 21mg/dL (7-20) 


 


Creatinine


  0.8mg/dL


(0.6-1.0)


 


Estimated GFR


(Cockcroft-Gault) 84.6 


 


 


Glucose Level


  151mg/dL


(70-99)


 


Calcium Level


  9.5mg/dL


(8.5-10.1)











Images


Images


MRI reviewed





Assessment/Plan


Assessment/Plan


Headaches with elevated ESR/for temporal artery biopsy Monday.  Explained the 

procedure to Ms Duong, including that it is not a theraputic intervention but a 

diagnostic procedure. She understands and will proceed. 





Thanks for consult








DYLAN MAYFIELD MD Apr 7, 2017 13:12

## 2017-04-07 NOTE — RAD
Left lower extremity venous Doppler ultrasound 



History: Left lower extremity swelling, shortness of air. Pain.



Comparison: None.



Procedure: Color Doppler, spectral Doppler,  and grayscale images are obtained

with and without compression in the area of the common femoral vein,

superficial femoral vein - femoral vein junction, main femoral vein

(superficial femoral vein) and popliteal vein. Veins of the proximal calf are

also imaged.



Findings: There is normal duplex flow, color flow and compressibility of all

visualized vein segments. No evidence of deep venous thrombosis is present.



Impression: No evidence of left lower extremity deep venous thrombosis.

## 2017-04-08 VITALS — DIASTOLIC BLOOD PRESSURE: 73 MMHG | SYSTOLIC BLOOD PRESSURE: 151 MMHG

## 2017-04-08 VITALS — DIASTOLIC BLOOD PRESSURE: 75 MMHG | SYSTOLIC BLOOD PRESSURE: 157 MMHG

## 2017-04-08 VITALS — SYSTOLIC BLOOD PRESSURE: 149 MMHG | DIASTOLIC BLOOD PRESSURE: 73 MMHG

## 2017-04-08 VITALS — SYSTOLIC BLOOD PRESSURE: 120 MMHG | DIASTOLIC BLOOD PRESSURE: 75 MMHG

## 2017-04-08 VITALS — DIASTOLIC BLOOD PRESSURE: 57 MMHG | SYSTOLIC BLOOD PRESSURE: 137 MMHG

## 2017-04-08 RX ADMIN — GABAPENTIN SCH MG: 300 CAPSULE ORAL at 20:44

## 2017-04-08 RX ADMIN — IPRATROPIUM BROMIDE AND ALBUTEROL SULFATE SCH ML: .5; 3 SOLUTION RESPIRATORY (INHALATION) at 08:00

## 2017-04-08 RX ADMIN — FAMOTIDINE SCH MG: 20 TABLET ORAL at 08:05

## 2017-04-08 RX ADMIN — DULOXETINE HYDROCHLORIDE SCH MG: 30 CAPSULE, DELAYED RELEASE ORAL at 08:05

## 2017-04-08 RX ADMIN — ZOLPIDEM TARTRATE PRN MG: 5 TABLET ORAL at 20:44

## 2017-04-08 RX ADMIN — IPRATROPIUM BROMIDE AND ALBUTEROL SULFATE SCH ML: .5; 3 SOLUTION RESPIRATORY (INHALATION) at 21:26

## 2017-04-08 RX ADMIN — IPRATROPIUM BROMIDE AND ALBUTEROL SULFATE SCH ML: .5; 3 SOLUTION RESPIRATORY (INHALATION) at 17:53

## 2017-04-08 RX ADMIN — CALCIUM SCH MG: 500 TABLET ORAL at 17:19

## 2017-04-08 RX ADMIN — GUAIFENESIN SCH MG: 600 TABLET, EXTENDED RELEASE ORAL at 20:43

## 2017-04-08 RX ADMIN — CLONAZEPAM SCH MG: 0.5 TABLET ORAL at 20:44

## 2017-04-08 RX ADMIN — FUROSEMIDE SCH MG: 40 TABLET ORAL at 08:05

## 2017-04-08 RX ADMIN — GABAPENTIN SCH MG: 300 CAPSULE ORAL at 08:05

## 2017-04-08 RX ADMIN — PREDNISONE SCH MG: 20 TABLET ORAL at 08:05

## 2017-04-08 RX ADMIN — HYDROCODONE BITARTRATE AND ACETAMINOPHEN PRN TAB: 7.5; 325 TABLET ORAL at 08:06

## 2017-04-08 RX ADMIN — GABAPENTIN SCH MG: 300 CAPSULE ORAL at 14:47

## 2017-04-08 RX ADMIN — IPRATROPIUM BROMIDE AND ALBUTEROL SULFATE SCH ML: .5; 3 SOLUTION RESPIRATORY (INHALATION) at 11:57

## 2017-04-08 RX ADMIN — HYDROCODONE BITARTRATE AND ACETAMINOPHEN PRN TAB: 7.5; 325 TABLET ORAL at 20:44

## 2017-04-08 RX ADMIN — AMLODIPINE BESYLATE SCH MG: 10 TABLET ORAL at 08:06

## 2017-04-08 RX ADMIN — LEVOFLOXACIN SCH MG: 500 TABLET, FILM COATED ORAL at 08:05

## 2017-04-08 RX ADMIN — CLONAZEPAM SCH MG: 0.5 TABLET ORAL at 08:05

## 2017-04-08 RX ADMIN — GUAIFENESIN SCH MG: 600 TABLET, EXTENDED RELEASE ORAL at 08:05

## 2017-04-08 RX ADMIN — CALCIUM SCH MG: 500 TABLET ORAL at 08:06

## 2017-04-08 RX ADMIN — CALCIUM SCH MG: 500 TABLET ORAL at 12:13

## 2017-04-08 NOTE — PDOC
PROGRESS NOTES


Assessment


 Problems


Medical Problems:


(1) COPD with acute exacerbation


Status: Acute  





Temporal arteritis, ESR 87


As discussed with Dr. El the patient had a temporal artery biopsy, negative

, several years ago, and also carries a diagnosis of polymyalgia rheumatica. 

She was already on prednisone 10 mg daily at a baseline. Patient did not tell 

me any of this.


Fibromyalgia


Meningioma


Plan


She was on Solu-Medrol for her COPD, Continue prednisone increased dose of 60 

mg daily


Calcium, vitamin D, and Pepcid while on the higher dose of steroids


Cancel left temporal artery biopsy.


Continued radiologic observation of the meningioma which I do not think is the 

cause of the headaches


Followup with me in 1 month


Discussed with Dr. El


Subjective


pain is a little better


Objective





 Vital Signs








  Date Time  Temp Pulse Resp B/P Pulse Ox O2 Delivery O2 Flow Rate FiO2


 


4/8/17 11:58     94 Nasal Cannula 2.0 


 


4/8/17 11:36 98.2 71 21 157/75    





 98.2       














 Intake and Output 


 


 4/8/17





 07:00


 


Intake Total 1850 ml


 


Balance 1850 ml


 


 


 


Intake Oral 1850 ml


 


# Voids 8








PHYSICAL EXAM


She is tender over the left temporal artery


Alert. Oriented to time, place and person.


PERRL.


EOMI.


CN: no focal findings.


Fundi benign


Muscle tone: normal.


Muscle strength: 5/5


DTR: 2+


Plantar reflex: Flexor


Gait: not examined in bed.


Sensory exam: no abnormal findings.


No cerebellar signs elicited.





Review of Relevant


I have reviewed the following items unique (where applicable) has been applied.


Labs





Laboratory Tests








Test


  4/7/17


04:23


 


White Blood Count


  11.6x10^3/uL


(4.0-11.0)


 


Red Blood Count


  3.82x10^6/uL


(3.50-5.40)


 


Hemoglobin


  9.9g/dL


(12.0-15.5)


 


Hematocrit


  32.4%


(36.0-47.0)


 


Mean Corpuscular Volume 85fL () 


 


Mean Corpuscular Hemoglobin 26pg (25-35) 


 


Mean Corpuscular Hemoglobin


Concent 31g/dL (31-37) 


 


 


Red Cell Distribution Width


  18.9%


(11.5-14.5)


 


Platelet Count


  306x10^3/uL


(140-400)


 


Erythrocyte Sedimentation Rate 87 (0-25) 


 


Sodium Level


  140mmol/L


(136-145)


 


Potassium Level


  4.4mmol/L


(3.5-5.1)


 


Chloride Level


  101mmol/L


()


 


Carbon Dioxide Level


  35mmol/L


(21-32)


 


Anion Gap 4 (6-14) 


 


Blood Urea Nitrogen 21mg/dL (7-20) 


 


Creatinine


  0.8mg/dL


(0.6-1.0)


 


Estimated GFR


(Cockcroft-Gault) 84.6 


 


 


Glucose Level


  151mg/dL


(70-99)


 


Calcium Level


  9.5mg/dL


(8.5-10.1)








Medications





 Current Medications


Albuterol/ Ipratropium (Duoneb) 3 ml 1X  ONCE NEB  Last administered on 4/5/ 17at 16:33;  Start 4/5/17 at 16:45;  Stop 4/5/17 at 16:46;  Status DC


Methylprednisolone Sodium Succinate (Solu-Medrol 125mg Vial) 80 mg 1X  ONCE IV  

Last administered on 4/5/17at 17:25;  Start 4/5/17 at 16:45;  Stop 4/5/17 at 16:

46;  Status DC


Albuterol Sulfate (Ventolin Neb Soln) 10 mg 1X  ONCE CONT NEB  Last 

administered on 4/5/17at 16:33;  Start 4/5/17 at 17:00;  Stop 4/5/17 at 17:01;  

Status DC


Fentanyl Citrate (Fentanyl 2ml Vial) 50 mcg 1X  ONCE IV  Last administered on 4/ 5/17at 17:25;  Start 4/5/17 at 17:00;  Stop 4/5/17 at 17:01;  Status DC


Furosemide (Lasix) 40 mg 1X  ONCE IVP  Last administered on 4/5/17at 19:13;  

Start 4/5/17 at 17:45;  Stop 4/5/17 at 17:46;  Status DC


Albuterol/ Ipratropium (Duoneb) 3 ml RTQID NEB  Last administered on 4/6/17at 11

:12;  Start 4/5/17 at 20:00;  Stop 4/6/17 at 12:44;  Status DC


Methylprednisolone Sodium Succinate (Solu-Medrol 125mg Vial) 125 mg Q12HR IV  

Last administered on 4/7/17at 09:02;  Start 4/5/17 at 21:00;  Stop 4/7/17 at 10:

11;  Status DC


Albuterol/ Ipratropium (Duoneb) 3 ml RTQID NEB  Last administered on 4/8/17at 11

:57;  Start 4/6/17 at 08:00


Acetaminophen (Tylenol) 650 mg PRN Q4HRS  PRN PO MILD PAIN Last administered on 

4/8/17at 12:13;  Start 4/5/17 at 20:30


Amlodipine Besylate (Norvasc) 10 mg DAILY08 PO  Last administered on 4/8/17at 08

:06;  Start 4/6/17 at 08:00


Furosemide (Lasix) 40 mg DAILY08 PO  Last administered on 4/8/17at 08:05;  

Start 4/6/17 at 08:00


Acetaminophen/ Hydrocodone Bitart (Lortab 7.5/325) 1 tab PRN Q8HRS  PRN PO 

MODERATE - SEVERE PAIN Last administered on 4/8/17at 08:06;  Start 4/5/17 at 20:

30


Tizanidine HCl (Zanaflex) 4 mg PRN Q8HRS  PRN PO MUSCLE SPASMS Last 

administered on 4/7/17at 21:17;  Start 4/5/17 at 20:30


Zolpidem Tartrate (Ambien) 5 mg QHS  PRN PO SLEEP Last administered on 4/6/17at 

21:34;  Start 4/5/17 at 21:00;  Stop 4/7/17 at 11:34;  Status DC


Clonazepam (Klonopin) 0.5 mg BID PO  Last administered on 4/8/17at 08:05;  

Start 4/6/17 at 10:00


Gabapentin (Neurontin) 300 mg TID PO  Last administered on 4/8/17at 08:05;  

Start 4/6/17 at 10:00


Duloxetine HCl (Cymbalta) 60 mg DAILY PO  Last administered on 4/8/17at 08:05;  

Start 4/6/17 at 10:00


Iohexol (Omnipaque 300 Mg/ml) 75 ml 1X  ONCE IV  Last administered on 4/6/17at 

10:14;  Start 4/6/17 at 10:00;  Stop 4/6/17 at 10:01;  Status DC


Info (Do NOT chart on this entry -- for MONITORING) 1 each PRN DAILY  PRN MC 

SEE COMMENTS;  Start 4/6/17 at 10:00;  Stop 4/8/17 at 09:59;  Status DC


Levofloxacin (Levaquin) 500 mg DAILY06 PO  Last administered on 4/8/17at 08:05;

  Start 4/6/17 at 13:00


Diazepam (Valium) 10 mg 1X  ONCE IV  Last administered on 4/6/17at 14:10;  

Start 4/6/17 at 14:15;  Stop 4/6/17 at 14:16;  Status DC


Gadobutrol (Gadavist) 7.5 mmol 1X  ONCE IV  Last administered on 4/6/17at 15:09

;  Start 4/6/17 at 14:45;  Stop 4/6/17 at 14:46;  Status DC


Ketorolac Tromethamine (Toradol) 30 mg 1X  ONCE IV  Last administered on 4/6/ 17at 15:51;  Start 4/6/17 at 16:00;  Stop 4/6/17 at 16:01;  Status DC


Prednisone (Prednisone) 60 mg DAILY PO  Last administered on 4/8/17at 08:05;  

Start 4/8/17 at 09:00


Zolpidem Tartrate (Ambien) 10 mg PRN QHS  PRN PO SLEEP Last administered on 4/7/ 17at 21:17;  Start 4/7/17 at 11:45


Famotidine (Pepcid) 20 mg DAILY PO  Last administered on 4/8/17at 08:05;  Start 

4/7/17 at 12:00


Calcium Carbonate/ Glycine (Oscal) 500 mg TIDAFTMEAL PO  Last administered on 4/ 8/17at 12:13;  Start 4/7/17 at 13:00


Vitamin D (Vitamin D3) 50,000 unit QMTH PO ;  Start 4/10/17 at 16:00


Fentanyl Citrate (Fentanyl 2ml Vial) 25 mcg PRN Q5MIN  PRN IV MILD PAIN;  Start 

4/10/17 at 07:00;  Stop 4/11/17 at 06:59


Fentanyl Citrate (Fentanyl 2ml Vial) 50 mcg PRN Q5MIN  PRN IV MODERATE PAIN;  

Start 4/10/17 at 07:00;  Stop 4/11/17 at 06:59


Morphine Sulfate 1 mg 1 mg PRN Q10MIN  PRN IV SEVERE PAIN;  Start 4/10/17 at 07:

00;  Stop 4/11/17 at 06:59


Lactated Ringer's (Iv Lactated Ringers) 1,000 ml @  0 mls/hr Q0M IV ;  Start 4/

10/17 at 07:00;  Stop 4/10/17 at 18:59


Lidocaine HCl 2 ml PRN 1X  PRN ID PRIOR TO IV START;  Start 4/10/17 at 07:00;  

Stop 4/11/17 at 06:59


Hydromorphone HCl (Dilaudid) 0.5 mg PRN Q10MIN  PRN IV SEV PAIN, Second choice;

  Start 4/10/17 at 07:00;  Stop 4/11/17 at 06:59


Prochlorperazine Edisylate (Compazine) 5 mg PACU PRN  PRN IV NAUSEA, MRX1;  

Start 4/10/17 at 07:00;  Stop 4/11/17 at 06:59


Guaifenesin (Mucinex) 600 mg BID PO  Last administered on 4/8/17at 08:05;  

Start 4/7/17 at 21:00


Montelukast Sodium (Singulair) 10 mg QHS PO ;  Start 4/8/17 at 21:00





Active Scripts


Active


Reported


Tylenol (Acetaminophen) 325 Mg Tablet 2 Tab PO PRN Q4HRS


Hydrocodone-Apap 7.5-325  ** (Hydrocodone Bit/Acetaminophen) 1 Each Tablet 1 

Tab PO Q8HRS PRN


Tizanidine Hcl 4 Mg Tablet   


Zolpidem Tartrate 5 Mg Tablet   


Furosemide 40 Mg Tablet   


Benazepril Hcl 40 Mg Tablet   


Amlodipine Besylate 10 Mg Tablet


Vitals/I & O





 Vital Sign - Last 24 Hours








 4/7/17 4/7/17 4/7/17 4/7/17





 15:16 18:11 19:00 20:00


 


Temp 98.4  98.7 





 98.4  98.7 


 


Pulse 81  77 


 


Resp 17  21 


 


B/P 142/73  172/90 


 


Pulse Ox 93  93 


 


O2 Delivery Nasal Cannula Nasal Cannula Nasal Cannula Nasal Cannula


 


O2 Flow Rate 2.0 2.0 2.0 2.0


 


    





    





 4/7/17 4/7/17 4/8/17 4/8/17





 21:08 22:59 07:44 08:00


 


Temp  98.1 98.5 





  98.1 98.5 


 


Pulse  77 69 


 


Resp  19 21 


 


B/P  152/68 149/73 


 


Pulse Ox  95 95 


 


O2 Delivery Nasal Cannula Nasal Cannula Nasal Cannula Nasal Cannula


 


O2 Flow Rate 2.0 2.0 2.0 2.0


 


    





    





 4/8/17 4/8/17 4/8/17 





 08:06 11:36 11:58 


 


Temp  98.2  





  98.2  


 


Pulse 69 71  


 


Resp  21  


 


B/P 149/73 157/75  


 


Pulse Ox  94 94 


 


O2 Delivery  Nasal Cannula Nasal Cannula 


 


O2 Flow Rate  2.0 2.0 














 Intake and Output   


 


 4/7/17 4/7/17 4/8/17





 15:00 23:00 07:00


 


Intake Total 400 ml 750 ml 700 ml


 


Balance 400 ml 750 ml 700 ml














ADRIANO ROGERS MD Apr 8, 2017 12:55

## 2017-04-08 NOTE — PDOC
PULMONARY PROGRESS NOTES


Subjective


c/o headache


no SOA, has cough, nasal congestion


Vitals





 Vital Signs








  Date Time  Temp Pulse Resp B/P Pulse Ox O2 Delivery O2 Flow Rate FiO2


 


4/8/17 11:36 98.2 71 21 157/75 94 Nasal Cannula 2.0 





 98.2       








ROS:  No Nausea, No Chest Pain, No Abdominal Pain


General:  Alert, No acute distress


Lungs:  Wheezing


Cardiovascular:  S1, S2


Abdomen:  Soft, Non-tender


Neuro Exam:  Alert, Oriented


Extremities:  Other (mild left lower ext edema)


Skin:  Warm


Labs





Laboratory Tests








Test


  4/7/17


04:23


 


White Blood Count


  11.6x10^3/uL


(4.0-11.0)


 


Red Blood Count


  3.82x10^6/uL


(3.50-5.40)


 


Hemoglobin


  9.9g/dL


(12.0-15.5)


 


Hematocrit


  32.4%


(36.0-47.0)


 


Mean Corpuscular Volume 85fL () 


 


Mean Corpuscular Hemoglobin 26pg (25-35) 


 


Mean Corpuscular Hemoglobin


Concent 31g/dL (31-37) 


 


 


Red Cell Distribution Width


  18.9%


(11.5-14.5)


 


Platelet Count


  306x10^3/uL


(140-400)


 


Erythrocyte Sedimentation Rate 87 (0-25) 


 


Sodium Level


  140mmol/L


(136-145)


 


Potassium Level


  4.4mmol/L


(3.5-5.1)


 


Chloride Level


  101mmol/L


()


 


Carbon Dioxide Level


  35mmol/L


(21-32)


 


Anion Gap 4 (6-14) 


 


Blood Urea Nitrogen 21mg/dL (7-20) 


 


Creatinine


  0.8mg/dL


(0.6-1.0)


 


Estimated GFR


(Cockcroft-Gault) 84.6 


 


 


Glucose Level


  151mg/dL


(70-99)


 


Calcium Level


  9.5mg/dL


(8.5-10.1)








Medications





Active Scripts








 Medications  Dose


 Route/Sig Days Date Category


 


 Tylenol


  (Acetaminophen)


 325 Mg Tablet  2 Tab


 PO PRN Q4HRS   4/5/17 Reported


 


 Hydrocodone-Apap


 7.5-325  **


  (Hydrocodone


 Bit/Acetaminophen)


 1 Each Tablet  1 Tab


 PO Q8HRS PRN   4/5/17 Reported


 


 Tizanidine Hcl 4


 Mg Tablet  


    4/5/17 Reported


 


 Zolpidem Tartrate


 5 Mg Tablet  


    4/5/17 Reported


 


 Furosemide 40 Mg


 Tablet  


    4/5/17 Reported


 


 Benazepril Hcl 40


 Mg Tablet  


    4/5/17 Reported


 


 Amlodipine


 Besylate 10 Mg


 Tablet  


    4/5/17 Reported








Comments


CT CHEST


1. No CT evidence of central pulmonary emboli.


2. Moderate coronary artery calcifications.


3. Emphysema.


4. Stable 1 cm right middle lobe pulmonary nodule.


5. New mild scattered tree-in-bud and nodular pulmonary opacities. The


findings suggest infection and/or small airway disease. A neoplastic etiology


is less likely. CT follow-up is suggested.





Impression


.


1.  Acute on chronic hypercapnic respiratory failure secondary to chronic


obstructive pulmonary disease exacerbation and mild patchy pneumonitis.


2.  History of left lower extremity DVT, treated 5 years ago with Coumadin.  Now


comes in with some swelling in the left lower extremity and some chest pain.  CT


angiogram with no definite pulmonary embolism. no recurrent DVT


3.  Mild patchy pneumonitis/ stable right lung nodule.


4.  History of polymyalgia rheumatica, on chronic steroids.


5.  Abnormal ct chest with Stable 1 cm right middle lobe pulmonary nodule.


   New mild scattered tree-in-bud and nodular pulmonary opacities. The


findings suggest infection and/or small airway disease. A neoplastic etiology


is less likely. 


6. allergic rhinitis





Plan


.





1.  Continue with present nebulizer treatment.


2.  Continue with oxygen.


3.   follow up ABGs.as needed


4.  Empiric antibiotics.


5.  IV Solu-Medrol to prednisone w taper


6.  Venous Dopplers of left lower extremity.negative


7.  Follow up ct chest in 4-6 weeks


8. add SISI Mejia MD Apr 8, 2017 11:47

## 2017-04-08 NOTE — PDOC
SUBJECTIVE


Subjective


feels better this AM, HA some improved, still mild cough , SOB better





OBJECTIVE


Vital Signs





Vital Signs








  Date Time  Temp Pulse Resp B/P Pulse Ox O2 Delivery O2 Flow Rate FiO2


 


4/8/17 08:06  69  149/73    


 


4/8/17 08:00      Nasal Cannula 2.0 


 


4/8/17 07:44 98.5 69 21 149/73 95 Nasal Cannula 2.0 





 98.5       


 


4/7/17 22:59 98.1 77 19 152/68 95 Nasal Cannula 2.0 





 98.1       


 


4/7/17 21:08      Nasal Cannula 2.0 


 


4/7/17 20:00      Nasal Cannula 2.0 


 


4/7/17 19:00 98.7 77 21 172/90 93 Nasal Cannula 2.0 





 98.7       


 


4/7/17 18:11      Nasal Cannula 2.0 


 


4/7/17 15:16 98.4 81 17 142/73 93 Nasal Cannula 2.0 





 98.4       


 


4/7/17 12:48     94 Nasal Cannula 2.0 


 


4/7/17 10:52 98.3 74 17 156/79 93 Nasal Cannula 2.0 





 98.3       


 


4/7/17 09:04  78  145/68    








I & O











 Intake and Output 


 


 4/8/17





 07:00


 


Intake Total 1850 ml


 


Balance 1850 ml


 


 


 


Intake Oral 1850 ml


 


# Voids 8











PHYSICAL EXAM


Physical Exam


lungs with decrease BS, few wheezes


heart RRR


abd soft


ext no edema





ASSESSMENT/PLAN


Assessment/Plan


1.  Shortness of breath with hypoxia, due to chronic obstructive pulmonary


disease exacerbation, no evidence of pulmonary embolus on CT chest, also no 

evidence of DVT on venous Doppler of lower extremities


2.  Previous history of pulmonary nodules, followed by Pulmonary and has not had


any changes over the last several years.


3.  Headache multifactorial, could be related to her polymyalgia/temporal 

arteritis she is normally on prednisone 10 mg at home, currently on prednisone 

60 mg , 


4.  History of polymyalgia rheumatica, on chronic steroids.  She is currently 

on prednisone 60 mg daily


5.  Chronic obstructive pulmonary disease exacerbation and bronchitis.


6.  Peripheral neuropathy.


7.  Hypertension, hypertensive cardiovascular disease.


8.  Osteoarthritis.


9.  Hypothyroidism.


10. Large intracranial meningioma with edema in the right frontal lobe as per  

MRI


not sure having temporal artery Bx will change anything in management , she is 

already on steroid for PMR, had temporal Bx many years ago was neg but that is 

not conclusive any way, will evaluate need for O2 at home, increase activity if 

continue to improve may be home tomorrow if all agree with no need for TA Bx


Problems:  








SAIDA MATUTE MD Apr 8, 2017 08:56

## 2017-04-08 NOTE — PDOC
SURGICAL PROGRESS NOTE


Subjective


Pt reports feeling much better with being on abx


Vital Signs





 Vital Signs








  Date Time  Temp Pulse Resp B/P Pulse Ox O2 Delivery O2 Flow Rate FiO2


 


4/8/17 08:06  69  149/73    


 


4/8/17 08:00      Nasal Cannula 2.0 


 


4/8/17 07:44 98.5  21  95   





 98.5       








I&O











 Intake and Output 


 


 4/8/17





 06:59


 


Intake Total 1850 ml


 


Balance 1850 ml


 


 


 


Intake Oral 1850 ml


 


# Voids 8








General:  Alert, Oriented X3, Cooperative, No acute distress


Labs





Laboratory Tests








Test


  4/7/17


04:23


 


White Blood Count


  11.6x10^3/uL


(4.0-11.0)


 


Red Blood Count


  3.82x10^6/uL


(3.50-5.40)


 


Hemoglobin


  9.9g/dL


(12.0-15.5)


 


Hematocrit


  32.4%


(36.0-47.0)


 


Mean Corpuscular Volume 85fL () 


 


Mean Corpuscular Hemoglobin 26pg (25-35) 


 


Mean Corpuscular Hemoglobin


Concent 31g/dL (31-37) 


 


 


Red Cell Distribution Width


  18.9%


(11.5-14.5)


 


Platelet Count


  306x10^3/uL


(140-400)


 


Erythrocyte Sedimentation Rate 87 (0-25) 


 


Sodium Level


  140mmol/L


(136-145)


 


Potassium Level


  4.4mmol/L


(3.5-5.1)


 


Chloride Level


  101mmol/L


()


 


Carbon Dioxide Level


  35mmol/L


(21-32)


 


Anion Gap 4 (6-14) 


 


Blood Urea Nitrogen 21mg/dL (7-20) 


 


Creatinine


  0.8mg/dL


(0.6-1.0)


 


Estimated GFR


(Cockcroft-Gault) 84.6 


 


 


Glucose Level


  151mg/dL


(70-99)


 


Calcium Level


  9.5mg/dL


(8.5-10.1)








Problem List


 Problems


Medical Problems:


(1) COPD with acute exacerbation


Status: Acute  








Assessment/Plan


ELLIS


d/w Dr. El, biopsy not felt beneficial, at this time


will sign off, but please call for questions


Problems:  








JONATAN ARELLANO MD Apr 8, 2017 10:02

## 2017-04-09 VITALS — SYSTOLIC BLOOD PRESSURE: 157 MMHG | DIASTOLIC BLOOD PRESSURE: 55 MMHG

## 2017-04-09 VITALS — SYSTOLIC BLOOD PRESSURE: 156 MMHG | DIASTOLIC BLOOD PRESSURE: 55 MMHG

## 2017-04-09 VITALS — SYSTOLIC BLOOD PRESSURE: 147 MMHG | DIASTOLIC BLOOD PRESSURE: 60 MMHG

## 2017-04-09 LAB
ANION GAP SERPL CALC-SCNC: 1 MMOL/L (ref 6–14)
BUN SERPL-MCNC: 20 MG/DL (ref 7–20)
CALCIUM SERPL-MCNC: 9.1 MG/DL (ref 8.5–10.1)
CHLORIDE SERPL-SCNC: 100 MMOL/L (ref 98–107)
CO2 SERPL-SCNC: 42 MMOL/L (ref 21–32)
CREAT SERPL-MCNC: 0.6 MG/DL (ref 0.6–1)
ERYTHROCYTE [DISTWIDTH] IN BLOOD BY AUTOMATED COUNT: 18.6 % (ref 11.5–14.5)
GFR SERPLBLD BASED ON 1.73 SQ M-ARVRAT: 117.9 ML/MIN
GLUCOSE SERPL-MCNC: 102 MG/DL (ref 70–99)
HCT VFR BLD CALC: 31.9 % (ref 36–47)
HGB BLD-MCNC: 10.1 G/DL (ref 12–15.5)
MCH RBC QN AUTO: 27 PG (ref 25–35)
MCHC RBC AUTO-ENTMCNC: 32 G/DL (ref 31–37)
MCV RBC AUTO: 85 FL (ref 79–100)
PLATELET # BLD AUTO: 314 X10^3/UL (ref 140–400)
POTASSIUM SERPL-SCNC: 3.4 MMOL/L (ref 3.5–5.1)
RBC # BLD AUTO: 3.74 X10^6/UL (ref 3.5–5.4)
SODIUM SERPL-SCNC: 143 MMOL/L (ref 136–145)
WBC # BLD AUTO: 10.5 X10^3/UL (ref 4–11)

## 2017-04-09 RX ADMIN — AMLODIPINE BESYLATE SCH MG: 10 TABLET ORAL at 08:44

## 2017-04-09 RX ADMIN — CALCIUM SCH MG: 500 TABLET ORAL at 08:44

## 2017-04-09 RX ADMIN — DULOXETINE HYDROCHLORIDE SCH MG: 30 CAPSULE, DELAYED RELEASE ORAL at 08:43

## 2017-04-09 RX ADMIN — GUAIFENESIN SCH MG: 600 TABLET, EXTENDED RELEASE ORAL at 08:42

## 2017-04-09 RX ADMIN — CALCIUM SCH MG: 500 TABLET ORAL at 12:01

## 2017-04-09 RX ADMIN — CLONAZEPAM SCH MG: 0.5 TABLET ORAL at 08:43

## 2017-04-09 RX ADMIN — LEVOFLOXACIN SCH MG: 500 TABLET, FILM COATED ORAL at 06:11

## 2017-04-09 RX ADMIN — IPRATROPIUM BROMIDE AND ALBUTEROL SULFATE SCH ML: .5; 3 SOLUTION RESPIRATORY (INHALATION) at 11:34

## 2017-04-09 RX ADMIN — FUROSEMIDE SCH MG: 40 TABLET ORAL at 08:43

## 2017-04-09 RX ADMIN — FAMOTIDINE SCH MG: 20 TABLET ORAL at 08:42

## 2017-04-09 RX ADMIN — HYDROCODONE BITARTRATE AND ACETAMINOPHEN PRN TAB: 7.5; 325 TABLET ORAL at 06:11

## 2017-04-09 RX ADMIN — PREDNISONE SCH MG: 20 TABLET ORAL at 08:43

## 2017-04-09 RX ADMIN — GABAPENTIN SCH MG: 300 CAPSULE ORAL at 08:42

## 2017-04-09 RX ADMIN — IPRATROPIUM BROMIDE AND ALBUTEROL SULFATE SCH ML: .5; 3 SOLUTION RESPIRATORY (INHALATION) at 08:00

## 2017-04-09 NOTE — PDOC
SUBJECTIVE


Subjective


feels better





OBJECTIVE


Objective


VSS


Vital Signs





Vital Signs








  Date Time  Temp Pulse Resp B/P Pulse Ox O2 Delivery O2 Flow Rate FiO2


 


4/9/17 11:35      Nasal Cannula 2.0 


 


4/9/17 10:46 98.1 64 20 147/60 93 Room Air  





 98.1       


 


4/9/17 08:44  67  156/55    


 


4/9/17 08:00      Nasal Cannula 2.0 


 


4/9/17 07:22 98.2 67 20 156/55 96 Room Air  





 98.2       


 


4/9/17 07:11   16  96 Nasal Cannula 2.0 


 


4/9/17 06:11   19  96 Nasal Cannula 2.0 


 


4/9/17 03:00 98.4 64 20 157/55 96 Room Air  





 98.4       


 


4/8/17 23:00 98.4 74 19 151/73 92 Nasal Cannula 2.0 





 98.4       


 


4/8/17 21:27     95 Nasal Cannula 2.0 


 


4/8/17 20:44   17  94 Nasal Cannula 2.0 


 


4/8/17 20:00      Nasal Cannula 2.0 


 


4/8/17 19:00 98.2 74 20 137/57 94 Nasal Cannula 2.0 





 98.2       


 


4/8/17 17:53      Nasal Cannula 2.0 


 


4/8/17 14:45 98.1 77 20 120/75 98 Nasal Cannula 2.0 





 98.1       








I & O











 Intake and Output 


 


 4/9/17





 07:00


 


Intake Total 600 ml


 


Output Total 400 ml


 


Balance 200 ml


 


 


 


Intake Oral 600 ml


 


Output Urine Total 400 ml


 


# Voids 1











PHYSICAL EXAM


Physical Exam


lungs with less wheezing and better air movement





ASSESSMENT/PLAN


Assessment/Plan


1.  Shortness of breath with hypoxia, due to chronic obstructive pulmonary


disease exacerbation, 


2.  Previous history of pulmonary nodules, stable


3.  Headache multifactorial, could be related to her polymyalgia/temporal 

arteritis 


4.  History of polymyalgia rheumatica, 


5.  Chronic obstructive pulmonary disease exacerbation and bronchitis.


6.  Peripheral neuropathy.


7.  Hypertension, hypertensive cardiovascular disease.


8.  Osteoarthritis.


9.  Hypothyroidism.


10. Large intracranial meningioma with edema in the right frontal lobe as per  

MRI


home today plan to taper steroid as out pt


Problems:  





COMMENT


Lab





Laboratory Tests








Test


  4/9/17


04:20 4/9/17


04:43 4/9/17


04:57


 


Erythrocyte Sedimentation Rate 72 (0-25)   


 


Sodium Level


  


  143mmol/L


(136-145) 


 


 


Potassium Level


  


  3.4mmol/L


(3.5-5.1) 


 


 


Chloride Level


  


  100mmol/L


() 


 


 


Carbon Dioxide Level


  


  42mmol/L


(21-32) 


 


 


Anion Gap  1 (6-14)  


 


Blood Urea Nitrogen  20mg/dL (7-20)  


 


Creatinine


  


  0.6mg/dL


(0.6-1.0) 


 


 


Estimated GFR


(Cockcroft-Gault) 


  117.9 


  


 


 


Glucose Level


  


  102mg/dL


(70-99) 


 


 


Calcium Level


  


  9.1mg/dL


(8.5-10.1) 


 


 


White Blood Count


  


  


  10.5x10^3/uL


(4.0-11.0)


 


Red Blood Count


  


  


  3.74x10^6/uL


(3.50-5.40)


 


Hemoglobin


  


  


  10.1g/dL


(12.0-15.5)


 


Hematocrit


  


  


  31.9%


(36.0-47.0)


 


Mean Corpuscular Volume   85fL () 


 


Mean Corpuscular Hemoglobin   27pg (25-35) 


 


Mean Corpuscular Hemoglobin


Concent 


  


  32g/dL (31-37) 


 


 


Red Cell Distribution Width


  


  


  18.6%


(11.5-14.5)


 


Platelet Count


  


  


  314x10^3/uL


(140-400)














SAIDA MATUTE MD Apr 9, 2017 12:17

## 2017-04-09 NOTE — PDOC3
Discharge Summary*


Date of Admission:  Apr 4, 2017


Date of Discharge:  Apr 9, 2017


Admitting Diagnosis


 Problems


Medical Problems:


(1) COPD with acute exacerbation


Status: Acute  








Final Diagnosis


1.  Shortness of breath with hypoxia, due to chronic obstructive pulmonary


disease exacerbation, 


2.  Previous history of pulmonary nodules, stable


3.  Headache multifactorial, could be related to her polymyalgia/temporal 

arteritis 


4.  History of polymyalgia rheumatica, 


5.  Chronic obstructive pulmonary disease exacerbation and bronchitis.


6.  Peripheral neuropathy.


7.  Hypertension, hypertensive cardiovascular disease.


8.  Osteoarthritis.


9.  Hypothyroidism.


10. Large intracranial meningioma with edema in the right frontal lobe as per  

MRI


home today plan to taper steroid as out pt Problems


Medical Problems:


(1) COPD with acute exacerbation


Status: Acute  





CONSULTS


pulmonary


neurology


general surgery


Procedures


MRI brain


CXR


CTA chest no PE


venous doppler US neg


Brief Hospital Course


Ms. Crouch  is a 76 old [sex] who presented with [ ]


Disposition/Orders:  D/C to Home


CONDITION AT DISCHARGE:  Improved


Diet:  Cardiac


Scheduled


([Montelukast Sodium]) 10 MG PO QHS 


Acetaminophen (Tylenol) 2 TAB PO PRN Q4HRS (Reported) 


Budesonide/Formoterol Fumarate (Symbicort 160-4.5 Mcg Inhaler) 2 PUFF IH BID 


Clonazepam (Clonazepam) 0.5 MG PO BID 


Duloxetine Hcl (Cymbalta) 60 MG PO DAILY 


Gabapentin (Gabapentin) 300 MG PO TID 


Ipratropium/Albuterol Sulfate (Duoneb 0.5-3(2.5) Mg/3 Ml) 3 ML NEB RTQID 


Levofloxacin (Levaquin) 500 MG PO DAILY06 


Prednisone (Prednisone) 60 MG PO DAILY 





Scheduled PRN


Hydrocodone Bit/Acetaminophen (Hydrocodone-Apap 7.5-325  **) 1 TAB PO Q8HRS PRN 

PRN PAIN 


Zolpidem Tartrate (Zolpidem Tartrate) 5 TAB PO HS PRN PRN INSOMNIA 





Miscellaneous Medications


Amlodipine Besylate (Amlodipine Besylate) (Reported) 


Benazepril Hcl (Benazepril Hcl) (Reported) 


Furosemide (Furosemide) (Reported) 


Tizanidine Hcl (Tizanidine Hcl) (Reported) 


FOLLOW UP APPOINTMENT:  


Dr. Matute 1 week


Dr. Cummings 4 weeks


Time Spent


Total time spent with patient [] minutes for coordination of care, counseling, 

and education.








SAIDA MATUTE MD Apr 9, 2017 12:21

## 2017-04-09 NOTE — PDOC
PULMONARY PROGRESS NOTES


Subjective


c/o headache


no SOA, has cough, nasal congestion


Vitals





 Vital Signs








  Date Time  Temp Pulse Resp B/P Pulse Ox O2 Delivery O2 Flow Rate FiO2


 


4/9/17 08:44  67  156/55    


 


4/9/17 07:22 98.2  20  96 Room Air  





 98.2       


 


4/9/17 07:11       2.0 








ROS:  No Nausea, No Chest Pain, No Abdominal Pain


General:  Alert, No acute distress


Lungs:  Wheezing


Cardiovascular:  S1, S2


Abdomen:  Soft, Non-tender


Neuro Exam:  Alert, Oriented


Extremities:  Other (mild left lower ext edema)


Skin:  Warm


Labs





Laboratory Tests








Test


  4/9/17


04:20 4/9/17


04:43 4/9/17


04:57


 


Erythrocyte Sedimentation Rate 72 (0-25)   


 


Sodium Level


  


  143mmol/L


(136-145) 


 


 


Potassium Level


  


  3.4mmol/L


(3.5-5.1) 


 


 


Chloride Level


  


  100mmol/L


() 


 


 


Carbon Dioxide Level


  


  42mmol/L


(21-32) 


 


 


Anion Gap  1 (6-14)  


 


Blood Urea Nitrogen  20mg/dL (7-20)  


 


Creatinine


  


  0.6mg/dL


(0.6-1.0) 


 


 


Estimated GFR


(Cockcroft-Gault) 


  117.9 


  


 


 


Glucose Level


  


  102mg/dL


(70-99) 


 


 


Calcium Level


  


  9.1mg/dL


(8.5-10.1) 


 


 


White Blood Count


  


  


  10.5x10^3/uL


(4.0-11.0)


 


Red Blood Count


  


  


  3.74x10^6/uL


(3.50-5.40)


 


Hemoglobin


  


  


  10.1g/dL


(12.0-15.5)


 


Hematocrit


  


  


  31.9%


(36.0-47.0)


 


Mean Corpuscular Volume   85fL () 


 


Mean Corpuscular Hemoglobin   27pg (25-35) 


 


Mean Corpuscular Hemoglobin


Concent 


  


  32g/dL (31-37) 


 


 


Red Cell Distribution Width


  


  


  18.6%


(11.5-14.5)


 


Platelet Count


  


  


  314x10^3/uL


(140-400)








Laboratory Tests








Test


  4/9/17


04:20 4/9/17


04:43 4/9/17


04:57


 


Erythrocyte Sedimentation Rate 72 (0-25)   


 


Sodium Level


  


  143mmol/L


(136-145) 


 


 


Potassium Level


  


  3.4mmol/L


(3.5-5.1) 


 


 


Chloride Level


  


  100mmol/L


() 


 


 


Carbon Dioxide Level


  


  42mmol/L


(21-32) 


 


 


Anion Gap  1 (6-14)  


 


Blood Urea Nitrogen  20mg/dL (7-20)  


 


Creatinine


  


  0.6mg/dL


(0.6-1.0) 


 


 


Estimated GFR


(Cockcroft-Gault) 


  117.9 


  


 


 


Glucose Level


  


  102mg/dL


(70-99) 


 


 


Calcium Level


  


  9.1mg/dL


(8.5-10.1) 


 


 


White Blood Count


  


  


  10.5x10^3/uL


(4.0-11.0)


 


Red Blood Count


  


  


  3.74x10^6/uL


(3.50-5.40)


 


Hemoglobin


  


  


  10.1g/dL


(12.0-15.5)


 


Hematocrit


  


  


  31.9%


(36.0-47.0)


 


Mean Corpuscular Volume   85fL () 


 


Mean Corpuscular Hemoglobin   27pg (25-35) 


 


Mean Corpuscular Hemoglobin


Concent 


  


  32g/dL (31-37) 


 


 


Red Cell Distribution Width


  


  


  18.6%


(11.5-14.5)


 


Platelet Count


  


  


  314x10^3/uL


(140-400)








Medications





Active Scripts








 Medications  Dose


 Route/Sig Days Date Category


 


 Tylenol


  (Acetaminophen)


 325 Mg Tablet  2 Tab


 PO PRN Q4HRS   4/5/17 Reported


 


 Hydrocodone-Apap


 7.5-325  **


  (Hydrocodone


 Bit/Acetaminophen)


 1 Each Tablet  1 Tab


 PO Q8HRS PRN   4/5/17 Reported


 


 Tizanidine Hcl 4


 Mg Tablet  


    4/5/17 Reported


 


 Zolpidem Tartrate


 5 Mg Tablet  


    4/5/17 Reported


 


 Furosemide 40 Mg


 Tablet  


    4/5/17 Reported


 


 Benazepril Hcl 40


 Mg Tablet  


    4/5/17 Reported


 


 Amlodipine


 Besylate 10 Mg


 Tablet  


    4/5/17 Reported








Comments


CT CHEST


1. No CT evidence of central pulmonary emboli.


2. Moderate coronary artery calcifications.


3. Emphysema.


4. Stable 1 cm right middle lobe pulmonary nodule.


5. New mild scattered tree-in-bud and nodular pulmonary opacities. The


findings suggest infection and/or small airway disease. A neoplastic etiology


is less likely. CT follow-up is suggested.





Impression


.


1.  Acute on chronic hypercapnic respiratory failure secondary to chronic


obstructive pulmonary disease exacerbation and mild patchy pneumonitis.


2.  History of left lower extremity DVT, treated 5 years ago with Coumadin.  Now


comes in with some swelling in the left lower extremity and some chest pain.  CT


angiogram with no definite pulmonary embolism. no recurrent DVT


3.  Mild patchy pneumonitis/ stable right lung nodule.


4.  History of polymyalgia rheumatica, on chronic steroids.


5.  Abnormal ct chest with Stable 1 cm right middle lobe pulmonary nodule.


   New mild scattered tree-in-bud and nodular pulmonary opacities. The


findings suggest infection and/or small airway disease. A neoplastic etiology


is less likely. 


6. allergic rhinitis





Plan


.





1.  Continue with present nebulizer treatment.


2.  Continue with oxygen.


3.   follow up ABGs.as needed


4.  Empiric antibiotics.


5.  IV Solu-Medrol to prednisone w taper


6.  Venous Dopplers of left lower extremity.negative


7.  Follow up ct chest in 4-6 weeks


8. gissel vaz rn, pt








SISI BOSS MD Apr 9, 2017 08:49

## 2018-02-16 ENCOUNTER — HOSPITAL ENCOUNTER (OUTPATIENT)
Dept: HOSPITAL 61 - KCIC | Age: 78
Discharge: HOME | End: 2018-02-16
Attending: INTERNAL MEDICINE
Payer: COMMERCIAL

## 2018-02-16 DIAGNOSIS — M17.0: Primary | ICD-10-CM

## 2018-02-16 DIAGNOSIS — M16.0: ICD-10-CM

## 2018-02-16 PROCEDURE — 73560 X-RAY EXAM OF KNEE 1 OR 2: CPT

## 2018-02-16 PROCEDURE — 73502 X-RAY EXAM HIP UNI 2-3 VIEWS: CPT

## 2018-03-16 ENCOUNTER — HOSPITAL ENCOUNTER (EMERGENCY)
Dept: HOSPITAL 61 - ER | Age: 78
Discharge: HOME | End: 2018-03-16
Payer: COMMERCIAL

## 2018-03-16 DIAGNOSIS — R06.02: ICD-10-CM

## 2018-03-16 DIAGNOSIS — E03.9: ICD-10-CM

## 2018-03-16 DIAGNOSIS — R07.89: Primary | ICD-10-CM

## 2018-03-16 DIAGNOSIS — I11.0: ICD-10-CM

## 2018-03-16 DIAGNOSIS — Z88.6: ICD-10-CM

## 2018-03-16 DIAGNOSIS — I50.9: ICD-10-CM

## 2018-03-16 DIAGNOSIS — M79.7: ICD-10-CM

## 2018-03-16 DIAGNOSIS — Z88.0: ICD-10-CM

## 2018-03-16 DIAGNOSIS — E66.9: ICD-10-CM

## 2018-03-16 DIAGNOSIS — G89.29: ICD-10-CM

## 2018-03-16 DIAGNOSIS — Z87.891: ICD-10-CM

## 2018-03-16 DIAGNOSIS — Z99.81: ICD-10-CM

## 2018-03-16 DIAGNOSIS — M81.0: ICD-10-CM

## 2018-03-16 DIAGNOSIS — J44.9: ICD-10-CM

## 2018-03-16 LAB
ADD MAN DIFF?: NO
ALBUMIN SERPL-MCNC: 3.7 G/DL (ref 3.4–5)
ALBUMIN/GLOB SERPL: 0.8 {RATIO} (ref 1–1.7)
ALP SERPL-CCNC: 86 U/L (ref 46–116)
ALT (SGPT): 15 U/L (ref 14–59)
ANION GAP SERPL CALC-SCNC: 9 MMOL/L (ref 6–14)
AST SERPL-CCNC: 30 U/L (ref 15–37)
BASO #: 0.1 X10^3/UL (ref 0–0.2)
BASO %: 1 % (ref 0–3)
BLOOD UREA NITROGEN: 9 MG/DL (ref 7–20)
BUN/CREAT SERPL: 13 (ref 6–20)
CALCIUM: 9.5 MG/DL (ref 8.5–10.1)
CHLORIDE: 101 MMOL/L (ref 98–107)
CO2 SERPL-SCNC: 30 MMOL/L (ref 21–32)
CREAT SERPL-MCNC: 0.7 MG/DL (ref 0.6–1)
EOS #: 0.2 X10^3/UL (ref 0–0.7)
EOS %: 3 % (ref 0–3)
GFR SERPLBLD BASED ON 1.73 SQ M-ARVRAT: 98.4 ML/MIN
GLOBULIN SER-MCNC: 4.5 G/DL (ref 2.2–3.8)
GLUCOSE SERPL-MCNC: 133 MG/DL (ref 70–99)
HCG SERPL-ACNC: 7 X10^3/UL (ref 4–11)
HEMATOCRIT: 33.5 % (ref 36–47)
HEMOGLOBIN: 10.9 G/DL (ref 12–15.5)
INR: 1 (ref 0.8–1.1)
LYMPH #: 1.1 X10^3/UL (ref 1–4.8)
LYMPH %: 16 % (ref 24–48)
MEAN CORPUSCULAR HEMOGLOBIN: 27 PG (ref 25–35)
MEAN CORPUSCULAR HGB CONC: 33 G/DL (ref 31–37)
MEAN CORPUSCULAR VOLUME: 84 FL (ref 79–100)
MONO #: 0.3 X10^3/UL (ref 0–1.1)
MONO %: 5 % (ref 0–9)
NEUT #: 5.3 X10^3UL (ref 1.8–7.7)
NEUT %: 76 % (ref 31–73)
NT-PRO BNP: 41 PG/ML (ref 0–449)
PARTIAL THROMBOPLASTIN TIME: 31 SEC (ref 24–38)
PLATELET COUNT: 393 X10^3/UL (ref 140–400)
POTASSIUM SERPL-SCNC: 4.1 MMOL/L (ref 3.5–5.1)
PROTHROMBIN TIME PATIENT: 12.9 SEC (ref 11.7–14)
RED BLOOD COUNT: 3.98 X10^6/UL (ref 3.5–5.4)
RED CELL DISTRIBUTION WIDTH: 17.5 % (ref 11.5–14.5)
SODIUM: 140 MMOL/L (ref 136–145)
TOTAL BILIRUBIN: 0.2 MG/DL (ref 0.2–1)
TOTAL PROTEIN: 8.2 G/DL (ref 6.4–8.2)
TROPONINI: < 0.017 NG/ML (ref 0–0.06)

## 2018-03-16 PROCEDURE — 84484 ASSAY OF TROPONIN QUANT: CPT

## 2018-03-16 PROCEDURE — 85025 COMPLETE CBC W/AUTO DIFF WBC: CPT

## 2018-03-16 PROCEDURE — 71046 X-RAY EXAM CHEST 2 VIEWS: CPT

## 2018-03-16 PROCEDURE — 85730 THROMBOPLASTIN TIME PARTIAL: CPT

## 2018-03-16 PROCEDURE — 96374 THER/PROPH/DIAG INJ IV PUSH: CPT

## 2018-03-16 PROCEDURE — 99285 EMERGENCY DEPT VISIT HI MDM: CPT

## 2018-03-16 PROCEDURE — 36415 COLL VENOUS BLD VENIPUNCTURE: CPT

## 2018-03-16 PROCEDURE — 83880 ASSAY OF NATRIURETIC PEPTIDE: CPT

## 2018-03-16 PROCEDURE — 80053 COMPREHEN METABOLIC PANEL: CPT

## 2018-03-16 PROCEDURE — 93005 ELECTROCARDIOGRAM TRACING: CPT

## 2018-03-16 PROCEDURE — 85610 PROTHROMBIN TIME: CPT

## 2018-03-16 RX ADMIN — MORPHINE SULFATE 1 MG: 4 INJECTION, SOLUTION INTRAMUSCULAR; INTRAVENOUS at 18:33

## 2018-03-16 RX ADMIN — ASPIRIN 325 MG ORAL TABLET 1 MG: 325 PILL ORAL at 18:32

## 2018-03-23 ENCOUNTER — HOSPITAL ENCOUNTER (EMERGENCY)
Dept: HOSPITAL 61 - ER | Age: 78
Discharge: HOME | End: 2018-03-23
Payer: COMMERCIAL

## 2018-03-23 DIAGNOSIS — R21: Primary | ICD-10-CM

## 2018-03-23 DIAGNOSIS — J44.9: ICD-10-CM

## 2018-03-23 DIAGNOSIS — Z88.6: ICD-10-CM

## 2018-03-23 DIAGNOSIS — Z88.0: ICD-10-CM

## 2018-03-23 DIAGNOSIS — I10: ICD-10-CM

## 2018-03-23 DIAGNOSIS — E03.9: ICD-10-CM

## 2018-03-23 DIAGNOSIS — M79.7: ICD-10-CM

## 2018-03-23 DIAGNOSIS — G89.29: ICD-10-CM

## 2018-03-23 PROCEDURE — 99283 EMERGENCY DEPT VISIT LOW MDM: CPT

## 2018-03-23 RX ADMIN — OXYCODONE HYDROCHLORIDE AND ACETAMINOPHEN 1 TAB: 5; 325 TABLET ORAL at 19:34

## 2018-03-26 ENCOUNTER — HOSPITAL ENCOUNTER (INPATIENT)
Dept: HOSPITAL 61 - 5 NORTH | Age: 78
LOS: 3 days | Discharge: HOME | DRG: 547 | End: 2018-03-29
Attending: FAMILY MEDICINE | Admitting: FAMILY MEDICINE
Payer: COMMERCIAL

## 2018-03-26 DIAGNOSIS — Z86.011: ICD-10-CM

## 2018-03-26 DIAGNOSIS — M81.0: ICD-10-CM

## 2018-03-26 DIAGNOSIS — I10: ICD-10-CM

## 2018-03-26 DIAGNOSIS — L25.9: ICD-10-CM

## 2018-03-26 DIAGNOSIS — Z88.0: ICD-10-CM

## 2018-03-26 DIAGNOSIS — M79.7: ICD-10-CM

## 2018-03-26 DIAGNOSIS — M19.90: ICD-10-CM

## 2018-03-26 DIAGNOSIS — G62.9: ICD-10-CM

## 2018-03-26 DIAGNOSIS — E03.9: ICD-10-CM

## 2018-03-26 DIAGNOSIS — L29.9: ICD-10-CM

## 2018-03-26 DIAGNOSIS — E05.90: ICD-10-CM

## 2018-03-26 DIAGNOSIS — J44.9: ICD-10-CM

## 2018-03-26 DIAGNOSIS — Z99.81: ICD-10-CM

## 2018-03-26 DIAGNOSIS — Z88.8: ICD-10-CM

## 2018-03-26 DIAGNOSIS — E87.6: ICD-10-CM

## 2018-03-26 DIAGNOSIS — Z92.3: ICD-10-CM

## 2018-03-26 DIAGNOSIS — M35.3: Primary | ICD-10-CM

## 2018-03-26 DIAGNOSIS — G89.29: ICD-10-CM

## 2018-03-26 LAB
ADD MAN DIFF?: NO
ALBUMIN SERPL-MCNC: 3.7 G/DL (ref 3.4–5)
ALBUMIN/GLOB SERPL: 0.9 {RATIO} (ref 1–1.7)
ALP SERPL-CCNC: 77 U/L (ref 46–116)
ALT (SGPT): 21 U/L (ref 14–59)
AMPHETAMINE/METHAMPHETAMINE: (no result)
ANION GAP SERPL CALC-SCNC: 8 MMOL/L (ref 6–14)
AST SERPL-CCNC: 32 U/L (ref 15–37)
BACTERIA,URINE: (no result) /HPF
BARBITURATES: (no result)
BASO #: 0 X10^3/UL (ref 0–0.2)
BASO %: 1 % (ref 0–3)
BENZODIAZEPINES: (no result)
BILIRUBIN,URINE: NEGATIVE
BLOOD UREA NITROGEN: 15 MG/DL (ref 7–20)
BUN/CREAT SERPL: 19 (ref 6–20)
CALCIUM: 9.9 MG/DL (ref 8.5–10.1)
CANNABINOIDS: (no result)
CHLORIDE: 101 MMOL/L (ref 98–107)
CK SERPL-CCNC: 42 U/L (ref 26–192)
CKMB INDEX: (no result) % (ref 0–4)
CKMB MASS: < 0.5 NG/ML (ref 0–3.6)
CLARITY,URINE: CLEAR
CO2 SERPL-SCNC: 33 MMOL/L (ref 21–32)
COCAINE: (no result)
COLOR,URINE: YELLOW
CREAT SERPL-MCNC: 0.8 MG/DL (ref 0.6–1)
EOS #: 0.8 X10^3/UL (ref 0–0.7)
EOS %: 11 % (ref 0–3)
ETHANOL, URINE: (no result)
ETHANOL: < 10 MG/DL (ref 0–10)
GFR SERPLBLD BASED ON 1.73 SQ M-ARVRAT: 84.4 ML/MIN
GLOBULIN SER-MCNC: 3.9 G/DL (ref 2.2–3.8)
GLUCOSE SERPL-MCNC: 138 MG/DL (ref 70–99)
GLUCOSE,URINE: NEGATIVE MG/DL
HCG SERPL-ACNC: 7.8 X10^3/UL (ref 4–11)
HEMATOCRIT: 34 % (ref 36–47)
HEMOGLOBIN: 11 G/DL (ref 12–15.5)
LYMPH #: 1 X10^3/UL (ref 1–4.8)
LYMPH %: 12 % (ref 24–48)
MEAN CORPUSCULAR HEMOGLOBIN: 27 PG (ref 25–35)
MEAN CORPUSCULAR HGB CONC: 32 G/DL (ref 31–37)
MEAN CORPUSCULAR VOLUME: 84 FL (ref 79–100)
METHADONE: (no result)
MONO #: 0.3 X10^3/UL (ref 0–1.1)
MONO %: 4 % (ref 0–9)
NEUT #: 5.6 X10^3UL (ref 1.8–7.7)
NEUT %: 72 % (ref 31–73)
NITRITE,URINE: NEGATIVE
OPIATES: (no result)
PH,URINE: 6
PHENCYCLIDINE: (no result)
PLATELET COUNT: 423 X10^3/UL (ref 140–400)
POTASSIUM SERPL-SCNC: 2.9 MMOL/L (ref 3.5–5.1)
PROTEIN,URINE: NEGATIVE MG/DL
RBC,URINE: 0 /HPF (ref 0–2)
RED BLOOD COUNT: 4.08 X10^6/UL (ref 3.5–5.4)
RED CELL DISTRIBUTION WIDTH: 17.2 % (ref 11.5–14.5)
SODIUM: 142 MMOL/L (ref 136–145)
SPECIFIC GRAVITY,URINE: 1.02
SQUAMOUS EPITHELIAL CELL,UR: (no result) /LPF
TOTAL BILIRUBIN: 0.4 MG/DL (ref 0.2–1)
TOTAL PROTEIN: 7.6 G/DL (ref 6.4–8.2)
TROPONINI: < 0.017 NG/ML (ref 0–0.06)
UROBILINOGEN,URINE: 1 MG/DL
WBC,URINE: 0 /HPF (ref 0–4)

## 2018-03-26 PROCEDURE — 94760 N-INVAS EAR/PLS OXIMETRY 1: CPT

## 2018-03-26 PROCEDURE — 84443 ASSAY THYROID STIM HORMONE: CPT

## 2018-03-26 PROCEDURE — 81001 URINALYSIS AUTO W/SCOPE: CPT

## 2018-03-26 PROCEDURE — 85025 COMPLETE CBC W/AUTO DIFF WBC: CPT

## 2018-03-26 PROCEDURE — 84439 ASSAY OF FREE THYROXINE: CPT

## 2018-03-26 PROCEDURE — 94640 AIRWAY INHALATION TREATMENT: CPT

## 2018-03-26 PROCEDURE — 84481 FREE ASSAY (FT-3): CPT

## 2018-03-26 PROCEDURE — 85651 RBC SED RATE NONAUTOMATED: CPT

## 2018-03-26 PROCEDURE — 82553 CREATINE MB FRACTION: CPT

## 2018-03-26 PROCEDURE — 96375 TX/PRO/DX INJ NEW DRUG ADDON: CPT

## 2018-03-26 PROCEDURE — 36415 COLL VENOUS BLD VENIPUNCTURE: CPT

## 2018-03-26 PROCEDURE — 80307 DRUG TEST PRSMV CHEM ANLYZR: CPT

## 2018-03-26 PROCEDURE — 71045 X-RAY EXAM CHEST 1 VIEW: CPT

## 2018-03-26 PROCEDURE — 99285 EMERGENCY DEPT VISIT HI MDM: CPT

## 2018-03-26 PROCEDURE — 93005 ELECTROCARDIOGRAM TRACING: CPT

## 2018-03-26 PROCEDURE — 84484 ASSAY OF TROPONIN QUANT: CPT

## 2018-03-26 PROCEDURE — 80053 COMPREHEN METABOLIC PANEL: CPT

## 2018-03-26 PROCEDURE — 96374 THER/PROPH/DIAG INJ IV PUSH: CPT

## 2018-03-26 RX ADMIN — POTASSIUM CITRATE 1 MEQ: 10 TABLET ORAL at 20:32

## 2018-03-26 RX ADMIN — TRIAMCINOLONE ACETONIDE 1 APP: 1 CREAM TOPICAL at 20:32

## 2018-03-26 RX ADMIN — GABAPENTIN 1 MG: 300 CAPSULE ORAL at 20:32

## 2018-03-26 RX ADMIN — FAMOTIDINE 1 MG: 10 INJECTION, SOLUTION INTRAVENOUS at 11:26

## 2018-03-26 RX ADMIN — TIZANIDINE 1 MG: 4 TABLET ORAL at 17:08

## 2018-03-26 RX ADMIN — BUDESONIDE 1 MG: 0.5 INHALANT RESPIRATORY (INHALATION) at 19:16

## 2018-03-26 RX ADMIN — DEXAMETHASONE SODIUM PHOSPHATE 1 MG: 4 INJECTION, SOLUTION INTRAMUSCULAR; INTRAVENOUS at 11:25

## 2018-03-26 RX ADMIN — POTASSIUM CHLORIDE 1 MEQ: 1500 TABLET, EXTENDED RELEASE ORAL at 12:13

## 2018-03-26 RX ADMIN — HYDROCODONE BITARTRATE AND ACETAMINOPHEN 1 TAB: 10; 325 TABLET ORAL at 17:08

## 2018-03-26 RX ADMIN — ALBUTEROL SULFATE 1 MG: 108 AEROSOL, METERED RESPIRATORY (INHALATION) at 19:16

## 2018-03-26 RX ADMIN — POTASSIUM CITRATE 1 MEQ: 10 TABLET ORAL at 14:00

## 2018-03-26 RX ADMIN — FENTANYL CITRATE 1 MCG: 50 INJECTION INTRAMUSCULAR; INTRAVENOUS at 11:26

## 2018-03-27 LAB
ADD MAN DIFF?: NO
ALBUMIN SERPL-MCNC: 3.1 G/DL (ref 3.4–5)
ALBUMIN/GLOB SERPL: 0.9 {RATIO} (ref 1–1.7)
ALP SERPL-CCNC: 74 U/L (ref 46–116)
ALT (SGPT): 12 U/L (ref 14–59)
ANION GAP SERPL CALC-SCNC: 7 MMOL/L (ref 6–14)
AST SERPL-CCNC: 18 U/L (ref 15–37)
BASO #: 0 X10^3/UL (ref 0–0.2)
BASO %: 0 % (ref 0–3)
BLOOD UREA NITROGEN: 18 MG/DL (ref 7–20)
BUN/CREAT SERPL: 26 (ref 6–20)
CALCIUM: 9.4 MG/DL (ref 8.5–10.1)
CHLORIDE: 105 MMOL/L (ref 98–107)
CO2 SERPL-SCNC: 31 MMOL/L (ref 21–32)
CREAT SERPL-MCNC: 0.7 MG/DL (ref 0.6–1)
EOS #: 0 X10^3/UL (ref 0–0.7)
EOS %: 0 % (ref 0–3)
GFR SERPLBLD BASED ON 1.73 SQ M-ARVRAT: 98.4 ML/MIN
GLOBULIN SER-MCNC: 3.6 G/DL (ref 2.2–3.8)
GLUCOSE SERPL-MCNC: 157 MG/DL (ref 70–99)
HCG SERPL-ACNC: 5.7 X10^3/UL (ref 4–11)
HEMATOCRIT: 29.5 % (ref 36–47)
HEMOGLOBIN: 9.7 G/DL (ref 12–15.5)
LYMPH #: 1 X10^3/UL (ref 1–4.8)
LYMPH %: 17 % (ref 24–48)
MEAN CORPUSCULAR HEMOGLOBIN: 28 PG (ref 25–35)
MEAN CORPUSCULAR HGB CONC: 33 G/DL (ref 31–37)
MEAN CORPUSCULAR VOLUME: 84 FL (ref 79–100)
MONO #: 0.5 X10^3/UL (ref 0–1.1)
MONO %: 9 % (ref 0–9)
NEUT #: 4.2 X10^3UL (ref 1.8–7.7)
NEUT %: 74 % (ref 31–73)
PLATELET COUNT: 378 X10^3/UL (ref 140–400)
POTASSIUM SERPL-SCNC: 4 MMOL/L (ref 3.5–5.1)
RED BLOOD COUNT: 3.51 X10^6/UL (ref 3.5–5.4)
RED CELL DISTRIBUTION WIDTH: 17.3 % (ref 11.5–14.5)
SEDIMENTATION RATE: 30 (ref 0–25)
SODIUM: 143 MMOL/L (ref 136–145)
THYROID STIM HORMONE (TSH): 0.1 UIU/ML (ref 0.36–3.74)
TOTAL BILIRUBIN: 0.3 MG/DL (ref 0.2–1)
TOTAL PROTEIN: 6.7 G/DL (ref 6.4–8.2)

## 2018-03-27 RX ADMIN — ACETAMINOPHEN 1 MG: 325 TABLET, FILM COATED ORAL at 11:31

## 2018-03-27 RX ADMIN — BUDESONIDE 1 MG: 0.5 INHALANT RESPIRATORY (INHALATION) at 08:38

## 2018-03-27 RX ADMIN — GABAPENTIN 1 MG: 300 CAPSULE ORAL at 08:39

## 2018-03-27 RX ADMIN — ALBUTEROL SULFATE 1 MG: 108 AEROSOL, METERED RESPIRATORY (INHALATION) at 12:15

## 2018-03-27 RX ADMIN — ALBUTEROL SULFATE 1 MG: 108 AEROSOL, METERED RESPIRATORY (INHALATION) at 16:07

## 2018-03-27 RX ADMIN — TIZANIDINE 1 MG: 4 TABLET ORAL at 20:57

## 2018-03-27 RX ADMIN — LISINOPRIL 1 MG: 20 TABLET ORAL at 08:39

## 2018-03-27 RX ADMIN — BUDESONIDE 1 MG: 0.5 INHALANT RESPIRATORY (INHALATION) at 16:07

## 2018-03-27 RX ADMIN — TIZANIDINE 1 MG: 4 TABLET ORAL at 00:50

## 2018-03-27 RX ADMIN — POTASSIUM CITRATE 1 MEQ: 10 TABLET ORAL at 08:40

## 2018-03-27 RX ADMIN — POTASSIUM CITRATE 1 MEQ: 10 TABLET ORAL at 14:23

## 2018-03-27 RX ADMIN — FUROSEMIDE 1 MG: 40 TABLET ORAL at 08:39

## 2018-03-27 RX ADMIN — LEFLUNOMIDE 1 MG: 10 TABLET ORAL at 08:40

## 2018-03-27 RX ADMIN — FAMOTIDINE 1 MG: 20 TABLET ORAL at 08:39

## 2018-03-27 RX ADMIN — TIZANIDINE 1 MG: 4 TABLET ORAL at 08:43

## 2018-03-27 RX ADMIN — POTASSIUM CITRATE 1 MEQ: 10 TABLET ORAL at 20:57

## 2018-03-27 RX ADMIN — BUPROPION HYDROCHLORIDE 1 MG: 150 TABLET, FILM COATED, EXTENDED RELEASE ORAL at 08:39

## 2018-03-27 RX ADMIN — GABAPENTIN 1 MG: 300 CAPSULE ORAL at 14:24

## 2018-03-27 RX ADMIN — TRIAMCINOLONE ACETONIDE 1 APP: 1 CREAM TOPICAL at 20:58

## 2018-03-27 RX ADMIN — TRIAMCINOLONE ACETONIDE 1 APP: 1 CREAM TOPICAL at 08:42

## 2018-03-27 RX ADMIN — ALBUTEROL SULFATE 1 MG: 108 AEROSOL, METERED RESPIRATORY (INHALATION) at 08:00

## 2018-03-27 RX ADMIN — GABAPENTIN 1 MG: 300 CAPSULE ORAL at 20:57

## 2018-03-27 RX ADMIN — ALBUTEROL SULFATE 1 MG: 108 AEROSOL, METERED RESPIRATORY (INHALATION) at 20:17

## 2018-03-28 LAB — FREE T4: 1.11 NG/DL (ref 0.76–1.46)

## 2018-03-28 RX ADMIN — ALBUTEROL SULFATE 1 MG: 108 AEROSOL, METERED RESPIRATORY (INHALATION) at 07:35

## 2018-03-28 RX ADMIN — LEFLUNOMIDE 1 MG: 10 TABLET ORAL at 09:07

## 2018-03-28 RX ADMIN — ALBUTEROL SULFATE 1 MG: 108 AEROSOL, METERED RESPIRATORY (INHALATION) at 11:22

## 2018-03-28 RX ADMIN — LISINOPRIL 1 MG: 20 TABLET ORAL at 09:07

## 2018-03-28 RX ADMIN — TIZANIDINE 1 MG: 4 TABLET ORAL at 05:13

## 2018-03-28 RX ADMIN — TIZANIDINE 1 MG: 4 TABLET ORAL at 21:03

## 2018-03-28 RX ADMIN — TEMAZEPAM 1 MG: 7.5 CAPSULE ORAL at 21:03

## 2018-03-28 RX ADMIN — ACETAMINOPHEN 1 MG: 325 TABLET, FILM COATED ORAL at 03:27

## 2018-03-28 RX ADMIN — GABAPENTIN 1 MG: 300 CAPSULE ORAL at 21:03

## 2018-03-28 RX ADMIN — TRIAMCINOLONE ACETONIDE 1 APP: 1 CREAM TOPICAL at 21:03

## 2018-03-28 RX ADMIN — GABAPENTIN 1 MG: 300 CAPSULE ORAL at 14:30

## 2018-03-28 RX ADMIN — BUPROPION HYDROCHLORIDE 1 MG: 150 TABLET, FILM COATED, EXTENDED RELEASE ORAL at 09:07

## 2018-03-28 RX ADMIN — ALBUTEROL SULFATE 1 MG: 108 AEROSOL, METERED RESPIRATORY (INHALATION) at 19:13

## 2018-03-28 RX ADMIN — FAMOTIDINE 1 MG: 20 TABLET ORAL at 09:09

## 2018-03-28 RX ADMIN — BUDESONIDE 1 MG: 0.5 INHALANT RESPIRATORY (INHALATION) at 07:35

## 2018-03-28 RX ADMIN — GABAPENTIN 1 MG: 300 CAPSULE ORAL at 09:09

## 2018-03-28 RX ADMIN — BUDESONIDE 1 MG: 0.5 INHALANT RESPIRATORY (INHALATION) at 19:13

## 2018-03-28 RX ADMIN — ALBUTEROL SULFATE 1 MG: 108 AEROSOL, METERED RESPIRATORY (INHALATION) at 15:04

## 2018-03-28 RX ADMIN — TRIAMCINOLONE ACETONIDE 1 APP: 1 CREAM TOPICAL at 09:10

## 2018-03-29 VITALS
SYSTOLIC BLOOD PRESSURE: 99 MMHG | SYSTOLIC BLOOD PRESSURE: 99 MMHG | DIASTOLIC BLOOD PRESSURE: 63 MMHG | DIASTOLIC BLOOD PRESSURE: 63 MMHG | SYSTOLIC BLOOD PRESSURE: 99 MMHG | DIASTOLIC BLOOD PRESSURE: 63 MMHG

## 2018-03-29 RX ADMIN — LISINOPRIL 1 MG: 20 TABLET ORAL at 08:50

## 2018-03-29 RX ADMIN — TIZANIDINE 1 MG: 4 TABLET ORAL at 06:39

## 2018-03-29 RX ADMIN — TRIAMCINOLONE ACETONIDE 1 APP: 1 CREAM TOPICAL at 09:00

## 2018-03-29 RX ADMIN — GABAPENTIN 1 MG: 300 CAPSULE ORAL at 08:51

## 2018-03-29 RX ADMIN — ACETAMINOPHEN 1 MG: 325 TABLET, FILM COATED ORAL at 08:51

## 2018-03-29 RX ADMIN — LEFLUNOMIDE 1 MG: 10 TABLET ORAL at 08:51

## 2018-03-29 RX ADMIN — BUDESONIDE 1 MG: 0.5 INHALANT RESPIRATORY (INHALATION) at 07:34

## 2018-03-29 RX ADMIN — ALBUTEROL SULFATE 1 MG: 108 AEROSOL, METERED RESPIRATORY (INHALATION) at 11:15

## 2018-03-29 RX ADMIN — ALBUTEROL SULFATE 1 MG: 108 AEROSOL, METERED RESPIRATORY (INHALATION) at 07:34

## 2018-03-29 RX ADMIN — FAMOTIDINE 1 MG: 20 TABLET ORAL at 08:51

## 2018-03-29 RX ADMIN — BUPROPION HYDROCHLORIDE 1 MG: 150 TABLET, FILM COATED, EXTENDED RELEASE ORAL at 08:50

## 2018-04-25 ENCOUNTER — HOSPITAL ENCOUNTER (OUTPATIENT)
Dept: HOSPITAL 61 - KCIC US | Age: 78
Discharge: HOME | End: 2018-04-25
Attending: INTERNAL MEDICINE
Payer: COMMERCIAL

## 2018-04-25 DIAGNOSIS — R94.6: Primary | ICD-10-CM

## 2018-04-25 PROCEDURE — 76536 US EXAM OF HEAD AND NECK: CPT

## 2018-08-16 ENCOUNTER — HOSPITAL ENCOUNTER (OUTPATIENT)
Dept: HOSPITAL 61 - LAB | Age: 78
Discharge: HOME | End: 2018-08-16
Attending: INTERNAL MEDICINE
Payer: COMMERCIAL

## 2018-08-16 DIAGNOSIS — E03.9: ICD-10-CM

## 2018-08-16 DIAGNOSIS — Z88.6: ICD-10-CM

## 2018-08-16 DIAGNOSIS — Z87.891: ICD-10-CM

## 2018-08-16 DIAGNOSIS — Z88.0: ICD-10-CM

## 2018-08-16 DIAGNOSIS — I11.0: Primary | ICD-10-CM

## 2018-08-16 DIAGNOSIS — J44.9: ICD-10-CM

## 2018-08-16 DIAGNOSIS — Z88.8: ICD-10-CM

## 2018-08-16 DIAGNOSIS — I50.9: ICD-10-CM

## 2018-08-16 DIAGNOSIS — D50.8: ICD-10-CM

## 2018-08-16 LAB
ALBUMIN SERPL-MCNC: 3.7 G/DL (ref 3.4–5)
ALBUMIN/GLOB SERPL: 0.8 {RATIO} (ref 1–1.7)
ALP SERPL-CCNC: 81 U/L (ref 46–116)
ALT SERPL-CCNC: 11 U/L (ref 14–59)
ANION GAP SERPL CALC-SCNC: 6 MMOL/L (ref 6–14)
APTT PPP: YELLOW S
AST SERPL-CCNC: 17 U/L (ref 15–37)
BACTERIA #/AREA URNS HPF: (no result) /HPF
BASOPHILS # BLD AUTO: 0.1 X10^3/UL (ref 0–0.2)
BASOPHILS NFR BLD: 1 % (ref 0–3)
BILIRUB SERPL-MCNC: 0.3 MG/DL (ref 0.2–1)
BILIRUB UR QL STRIP: NEGATIVE
BUN SERPL-MCNC: 13 MG/DL (ref 7–20)
BUN/CREAT SERPL: 14 (ref 6–20)
CALCIUM SERPL-MCNC: 9.3 MG/DL (ref 8.5–10.1)
CHLORIDE SERPL-SCNC: 98 MMOL/L (ref 98–107)
CHOLEST SERPL-MCNC: 190 MG/DL (ref 0–200)
CHOLEST/HDLC SERPL: 3.1 {RATIO}
CO2 SERPL-SCNC: 35 MMOL/L (ref 21–32)
CREAT SERPL-MCNC: 0.9 MG/DL (ref 0.6–1)
EOSINOPHIL NFR BLD: 0.1 X10^3/UL (ref 0–0.7)
EOSINOPHIL NFR BLD: 2 % (ref 0–3)
ERYTHROCYTE [DISTWIDTH] IN BLOOD BY AUTOMATED COUNT: 18.4 % (ref 11.5–14.5)
FIBRINOGEN PPP-MCNC: CLEAR MG/DL
GFR SERPLBLD BASED ON 1.73 SQ M-ARVRAT: 73.5 ML/MIN
GLOBULIN SER-MCNC: 4.5 G/DL (ref 2.2–3.8)
GLUCOSE SERPL-MCNC: 113 MG/DL (ref 70–99)
HCT VFR BLD CALC: 31 % (ref 36–47)
HDLC SERPL-MCNC: 62 MG/DL (ref 40–60)
HGB BLD-MCNC: 10.3 G/DL (ref 12–15.5)
HYALINE CASTS #/AREA URNS LPF: (no result) /HPF
LDLC: 106 MG/DL (ref 0–100)
LYMPHOCYTES # BLD: 1.5 X10^3/UL (ref 1–4.8)
LYMPHOCYTES NFR BLD AUTO: 21 % (ref 24–48)
MCH RBC QN AUTO: 27 PG (ref 25–35)
MCHC RBC AUTO-ENTMCNC: 33 G/DL (ref 31–37)
MCV RBC AUTO: 83 FL (ref 79–100)
MONO #: 0.5 X10^3/UL (ref 0–1.1)
MONOCYTES NFR BLD: 7 % (ref 0–9)
NEUT #: 4.9 X10^3UL (ref 1.8–7.7)
NEUTROPHILS NFR BLD AUTO: 70 % (ref 31–73)
NITRITE UR QL STRIP: NEGATIVE
PH UR STRIP: 5.5 [PH]
PLATELET # BLD AUTO: 405 X10^3/UL (ref 140–400)
POTASSIUM SERPL-SCNC: 3.1 MMOL/L (ref 3.5–5.1)
PROT SERPL-MCNC: 8.2 G/DL (ref 6.4–8.2)
PROT UR STRIP-MCNC: NEGATIVE MG/DL
RBC # BLD AUTO: 3.74 X10^6/UL (ref 3.5–5.4)
RBC #/AREA URNS HPF: 0 /HPF (ref 0–2)
SODIUM SERPL-SCNC: 139 MMOL/L (ref 136–145)
SQUAMOUS #/AREA URNS LPF: (no result) /LPF
TRIGL SERPL-MCNC: 112 MG/DL (ref 0–150)
UROBILINOGEN UR-MCNC: 0.2 MG/DL
VLDLC: 22 MG/DL (ref 0–40)
WBC # BLD AUTO: 7.1 X10^3/UL (ref 4–11)
WBC #/AREA URNS HPF: 0 /HPF (ref 0–4)

## 2018-08-16 PROCEDURE — 36415 COLL VENOUS BLD VENIPUNCTURE: CPT

## 2018-08-16 PROCEDURE — 83540 ASSAY OF IRON: CPT

## 2018-08-16 PROCEDURE — 85025 COMPLETE CBC W/AUTO DIFF WBC: CPT

## 2018-08-16 PROCEDURE — 82728 ASSAY OF FERRITIN: CPT

## 2018-08-16 PROCEDURE — 80053 COMPREHEN METABOLIC PANEL: CPT

## 2018-08-16 PROCEDURE — 81001 URINALYSIS AUTO W/SCOPE: CPT

## 2018-08-16 PROCEDURE — 83550 IRON BINDING TEST: CPT

## 2018-08-16 PROCEDURE — 80061 LIPID PANEL: CPT

## 2018-08-16 PROCEDURE — 84443 ASSAY THYROID STIM HORMONE: CPT

## 2018-08-16 PROCEDURE — 82607 VITAMIN B-12: CPT

## 2018-08-16 PROCEDURE — 82746 ASSAY OF FOLIC ACID SERUM: CPT

## 2018-12-14 ENCOUNTER — HOSPITAL ENCOUNTER (OUTPATIENT)
Dept: HOSPITAL 61 - ECHO | Age: 78
Discharge: HOME | End: 2018-12-14
Attending: INTERNAL MEDICINE
Payer: COMMERCIAL

## 2018-12-14 DIAGNOSIS — F17.210: ICD-10-CM

## 2018-12-14 DIAGNOSIS — R01.1: Primary | ICD-10-CM

## 2018-12-14 PROCEDURE — 93306 TTE W/DOPPLER COMPLETE: CPT

## 2018-12-14 NOTE — CARD
MR#: [id number]

Account#: [id number]

Accession#: [id number]

Date of Study: 12/14/2018

Ordering Physician: ASUNCION RICHARDSON,

Referring Physician: ASUNCION RICHARDSON

Tech: Holly Hdez SANTO





--------------- APPROVED REPORT --------------





EXAM: Two-dimensional and M-mode echocardiogram with Doppler and color Doppler.



Other Information 

Quality : AverageHR: 85bpm

Rhythm : NSR



INDICATION

Murmur 



2D DIMENSIONS 

RVDd2.7 (2.9-3.5cm)Left Atrium(2D)3.2 (1.6-4.0cm)

IVSd1.2 (0.7-1.1cm)Aortic Root(2D)2.9 (2.0-3.7cm)

LVDd3.8 (3.9-5.9cm)LVOT Diameter2.0 (1.8-2.4cm)

PWd1.1 (0.7-1.1cm)LVDs2.4 (2.5-4.0cm)

FS (%) 37.5 %SV43.1 ml



M-Mode DIMENSIONS 

Left Atrium(MM)3.32 (2.5-4.0cm)Aortic Root3.06 (2.2-3.7cm)



Aortic Valve

AoV Peak Ajith.164.6cm/sAoV VTI32.9cm

AO Peak GR.10.8mmHgLVOT Peak Ajith.130.4cm/s

AO Mean GR.7mmHgAVA (VMAX)2.41cm2

ALAN   (VTI)2.40cm2



Mitral Valve

MV E Tlhksnid12.3cm/sMV DECEL SIEC431iw

MV A Pxbczrxl57.2cm/sE/A  Ratio0.9

MV A Vquqcvvj993qd



Pulmonary Valve

PV Peak Qqiziizt701.8cm/s



 LEFT VENTRICLE 

The left ventricle is normal size. There is borderline to mild concentric left ventricular hypertroph
y. The left ventricular systolic function is normal and the ejection fraction is within normal range.
 Ventricular ejection fraction is 60-65%. There is normal LV segmental wall motion. Transmitral Doppl
er flow pattern is Grade II-pseudonormal filling dynamics.



 RIGHT VENTRICLE 

The right ventricle is normal size. The right ventricular systolic function is normal.



 ATRIA 

The left atrium size is normal. The right atrium size is normal. The interatrial septum is intact wit
h no evidence for an atrial septal defect or patent foramen ovale as noted on 2-D or Doppler imaging.




 AORTIC VALVE 

The aortic valve is mildly calcified. The aortic valve is probably trileaflet. Doppler and Color Flow
 revealed no significant aortic regurgitation. There is no significant aortic valvular stenosis.



 MITRAL VALVE 

The mitral valve is normal in structure and function. There is no evidence of mitral valve prolapse. 
There is no mitral valve stenosis. Doppler and Color-flow revealed trace mitral regurgitation.



 TRICUSPID VALVE 

The tricuspid valve is normal in structure and function. Doppler and Color Flow revealed trace tricus
pid regurgitation. There is no tricuspid valve prolapse or vegetation. There is no tricuspid valve st
enosis.



 PULMONIC VALVE 

Pulmonic valve not well visualized.



 GREAT VESSELS 

The aortic root is normal in size. The ascending aorta is normal in size. The IVC is normal in size a
nd collapses >50% with inspiration.



 PERICARDIAL EFFUSION 

There is a trace pericardial effusion with no hemodynamic significance.



Critical Notification

Critical Value: No



<Conclusion>

The left ventricle is normal size.

The left ventricular systolic function is normal and the ejection fraction is within normal range.

Ventricular ejection fraction is 60-65%.

There is borderline to mild concentric left ventricular hypertrophy.

There is no significant aortic valvular stenosis.

Doppler and Color Flow revealed no significant aortic regurgitation.

Doppler and Color-flow revealed trace mitral regurgitation.

Doppler and Color Flow revealed trace tricuspid regurgitation.

There is a trace pericardial effusion with no hemodynamic significance.



Signed by : Jose Manuel Moreno MD

Electronically Approved : 12/14/2018 14:15:15

## 2019-01-22 ENCOUNTER — HOSPITAL ENCOUNTER (OUTPATIENT)
Dept: HOSPITAL 61 - LAB | Age: 79
Discharge: HOME | End: 2019-01-22
Attending: INTERNAL MEDICINE
Payer: COMMERCIAL

## 2019-01-22 DIAGNOSIS — M05.79: Primary | ICD-10-CM

## 2019-01-22 LAB
ALBUMIN SERPL-MCNC: 3.9 G/DL (ref 3.4–5)
ALBUMIN/GLOB SERPL: 0.8 {RATIO} (ref 1–1.7)
ALP SERPL-CCNC: 87 U/L (ref 46–116)
ALT SERPL-CCNC: 9 U/L (ref 14–59)
ANION GAP SERPL CALC-SCNC: 7 MMOL/L (ref 6–14)
AST SERPL-CCNC: 14 U/L (ref 15–37)
BASOPHILS # BLD AUTO: 0.1 X10^3/UL (ref 0–0.2)
BASOPHILS NFR BLD: 1 % (ref 0–3)
BILIRUB SERPL-MCNC: 0.3 MG/DL (ref 0.2–1)
BUN SERPL-MCNC: 18 MG/DL (ref 7–20)
BUN/CREAT SERPL: 20 (ref 6–20)
CALCIUM SERPL-MCNC: 10 MG/DL (ref 8.5–10.1)
CHLORIDE SERPL-SCNC: 95 MMOL/L (ref 98–107)
CO2 SERPL-SCNC: 36 MMOL/L (ref 21–32)
CREAT SERPL-MCNC: 0.9 MG/DL (ref 0.6–1)
CRP SERPL-MCNC: 30.8 MG/L (ref 0–3.3)
EOSINOPHIL NFR BLD: 0.1 X10^3/UL (ref 0–0.7)
EOSINOPHIL NFR BLD: 2 % (ref 0–3)
ERYTHROCYTE [DISTWIDTH] IN BLOOD BY AUTOMATED COUNT: 18.8 % (ref 11.5–14.5)
GFR SERPLBLD BASED ON 1.73 SQ M-ARVRAT: 73.5 ML/MIN
GLOBULIN SER-MCNC: 4.6 G/DL (ref 2.2–3.8)
GLUCOSE SERPL-MCNC: 112 MG/DL (ref 70–99)
HCT VFR BLD CALC: 32.2 % (ref 36–47)
HGB BLD-MCNC: 10.5 G/DL (ref 12–15.5)
LYMPHOCYTES # BLD: 1.6 X10^3/UL (ref 1–4.8)
LYMPHOCYTES NFR BLD AUTO: 25 % (ref 24–48)
MCH RBC QN AUTO: 27 PG (ref 25–35)
MCHC RBC AUTO-ENTMCNC: 33 G/DL (ref 31–37)
MCV RBC AUTO: 81 FL (ref 79–100)
MONO #: 0.5 X10^3/UL (ref 0–1.1)
MONOCYTES NFR BLD: 9 % (ref 0–9)
NEUT #: 4.1 X10^3UL (ref 1.8–7.7)
NEUTROPHILS NFR BLD AUTO: 64 % (ref 31–73)
PLATELET # BLD AUTO: 372 X10^3/UL (ref 140–400)
POTASSIUM SERPL-SCNC: 3.4 MMOL/L (ref 3.5–5.1)
PROT SERPL-MCNC: 8.5 G/DL (ref 6.4–8.2)
RBC # BLD AUTO: 3.97 X10^6/UL (ref 3.5–5.4)
SODIUM SERPL-SCNC: 138 MMOL/L (ref 136–145)
WBC # BLD AUTO: 6.3 X10^3/UL (ref 4–11)

## 2019-01-22 PROCEDURE — 86140 C-REACTIVE PROTEIN: CPT

## 2019-01-22 PROCEDURE — 80053 COMPREHEN METABOLIC PANEL: CPT

## 2019-01-22 PROCEDURE — 85025 COMPLETE CBC W/AUTO DIFF WBC: CPT

## 2019-01-22 PROCEDURE — 36415 COLL VENOUS BLD VENIPUNCTURE: CPT

## 2019-01-22 PROCEDURE — 85651 RBC SED RATE NONAUTOMATED: CPT

## 2019-08-20 ENCOUNTER — HOSPITAL ENCOUNTER (EMERGENCY)
Dept: HOSPITAL 61 - ER | Age: 79
Discharge: HOME | End: 2019-08-20
Payer: COMMERCIAL

## 2019-08-20 VITALS — DIASTOLIC BLOOD PRESSURE: 88 MMHG | SYSTOLIC BLOOD PRESSURE: 177 MMHG

## 2019-08-20 VITALS — BODY MASS INDEX: 28.44 KG/M2 | WEIGHT: 154.56 LBS | HEIGHT: 62 IN
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
## 2019-08-20 DIAGNOSIS — I10: ICD-10-CM

## 2019-08-20 DIAGNOSIS — M54.42: Primary | ICD-10-CM

## 2019-08-20 DIAGNOSIS — M25.552: ICD-10-CM

## 2019-08-20 DIAGNOSIS — E89.0: ICD-10-CM
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
## 2019-08-20 DIAGNOSIS — G89.29: ICD-10-CM
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
## 2019-08-20 DIAGNOSIS — M19.90: ICD-10-CM

## 2019-08-20 DIAGNOSIS — J44.9: ICD-10-CM

## 2019-08-20 PROCEDURE — 73552 X-RAY EXAM OF FEMUR 2/>: CPT

## 2019-08-20 PROCEDURE — 99284 EMERGENCY DEPT VISIT MOD MDM: CPT

## 2019-08-20 PROCEDURE — 72100 X-RAY EXAM L-S SPINE 2/3 VWS: CPT

## 2019-08-20 PROCEDURE — 73502 X-RAY EXAM HIP UNI 2-3 VIEWS: CPT

## 2019-08-20 NOTE — RAD
Exam: Lumbar spine 2 views left hip 2 views left femur 2 views

 

INDICATION: Fall

 

TECHNIQUE: Frontal and lateral views lumbar spine with spot magnification 

view of the lumbosacral junction. Frontal view of the pelvis with frontal 

and lateral views of the left hip. Frontal and lateral views of the left 

femur.

 

Comparisons: None

 

FINDINGS:

Pelvis:

Bone mineralization is normal. No acute or healed fractures are 

identified. Soft tissues are unremarkable. Joint spaces are 

well-maintained.

 

Femur:

Bone mineralization is normal. No acute or healed fractures. Soft tissues 

are unremarkable. Joint spaces are well-maintained.

 

Lumbar spine:

Vertebral body heights and alignment are well-maintained. Mild 

degenerative disc disease greatest at L5-S1. Bilateral facet arthropathy 

throughout the lumbar spine. Visualized paraspinal soft tissues are 

unremarkable.

 

IMPRESSION:

1.  No acute osseous abnormality of the pelvis

2.  No acute osseous abnormality of the left femur

3.  Mild spondylotic changes lumbar spine as described above.

 

Electronically signed by: Domingo Milian MD (8/20/2019 6:35 PM) [facility]

## 2019-08-20 NOTE — RAD
Exam: Lumbar spine 2 views left hip 2 views left femur 2 views

 

INDICATION: Fall

 

TECHNIQUE: Frontal and lateral views lumbar spine with spot magnification 

view of the lumbosacral junction. Frontal view of the pelvis with frontal 

and lateral views of the left hip. Frontal and lateral views of the left 

femur.

 

Comparisons: None

 

FINDINGS:

Pelvis:

Bone mineralization is normal. No acute or healed fractures are 

identified. Soft tissues are unremarkable. Joint spaces are 

well-maintained.

 

Femur:

Bone mineralization is normal. No acute or healed fractures. Soft tissues 

are unremarkable. Joint spaces are well-maintained.

 

Lumbar spine:

Vertebral body heights and alignment are well-maintained. Mild 

degenerative disc disease greatest at L5-S1. Bilateral facet arthropathy 

throughout the lumbar spine. Visualized paraspinal soft tissues are 

unremarkable.

 

IMPRESSION:

1.  No acute osseous abnormality of the pelvis

2.  No acute osseous abnormality of the left femur

3.  Mild spondylotic changes lumbar spine as described above.

 

Electronically signed by: Domingo Milian MD (8/20/2019 6:35 PM) [facility]

## 2019-08-20 NOTE — PHYS DOC
Past Medical History


Past Medical History:  Arthritis, COPD, Fibromyalgia, Hypertension, Hypothyroid,

Other


Additional Past Medical Histor:  neuropathy,osteoporosis,sleeping 

problem,chronic pain.


 (ADRIAN POSADAS)


Past Surgical History:  Other


Additional Past Surgical Histo:  PARTIAL THYROIDECTOMY


 (ADRIAN POSADAS)


Alcohol Use:  None


Drug Use:  None


 (ADRIAN POSADAS)





Adult General


Chief Complaint


Chief Complaint:  LOWEREXTREMITY INJURY





HPI


HPI





Patient is a 79  year old female who presents to the ED today complaining of 10 

out of 10 left low back pain radiating to the left hip that began a month ago 

after she  fell. Patient describes the pain as sharp and constant worse on 

weight bearing. Denies anything specifically relieving the pain though she 

states she ran out of hydrocodone. Denies any numbness or tingling to bilateral 

lower extremities. Denies any loss of bowel bladder function


 (ADRIAN POSADAS)





Review of Systems


Review of Systems





Constitutional: Denies fever or chills []


GI: Denies abdominal pain, nausea, vomiting, bloody stools or diarrhea []


: Denies dysuria or hematuria []


Musculoskeletal: Reports left low back pain radiating to the left hip into the 

left lower extremity.


Integument: Denies rash or skin lesions []


Neurologic: Denies headache, focal weakness or sensory changes []








All other systems were reviewed and found to be within normal limits, except as 

documented in this note.


 (ADRIAN POSADAS)





Current Medications


Current Medications





Current Medications








 Medications


  (Trade)  Dose


 Ordered  Sig/Bouchra  Start Time


 Stop Time Status Last Admin


Dose Admin


 


 Acetaminophen/


 Hydrocodone Bitart


  (Lortab 5/325)  2 tab  1X  ONCE  8/20/19 17:30


 8/20/19 17:31 DC 8/20/19 17:26


2 TAB


 


 Cyclobenzaprine


 HCl


  (Flexeril)  10 mg  1X  ONCE  8/20/19 17:30


 8/20/19 17:31 DC 8/20/19 17:26


10 MG





 (RACHEL SMITH MD)





Allergies


Allergies





Allergies








Coded Allergies Type Severity Reaction Last Updated Verified


 


  Penicillins Allergy Intermediate Rash 4/5/17 Yes


 


  ibuprofen Allergy Intermediate rash 3/13/15 Yes





 (RACHEL SMITH MD)





Physical Exam


Physical Exam





Constitutional: Well developed, well nourished, no acute distress, non-toxic 

appearance. []


Skin: Warm, dry, no erythema, no rash. [] 


Back: No tenderness, no CVA tenderness. [] 


Extremities: No tenderness, no cyanosis, no clubbing, ROM intact, no edema. [] 


Neurologic: Alert and oriented X 3, normal motor function, normal sensory 

function, no focal deficits noted. []


Psychologic: Affect normal, judgement normal, mood normal. []


 (ADRIAN POSADAS)





Current Patient Data


Vital Signs





                                   Vital Signs








  Date Time  Temp Pulse Resp B/P (MAP) Pulse Ox O2 Delivery O2 Flow Rate FiO2


 


8/20/19 18:17   14  100   


 


8/20/19 17:15 98.8 87  177/88 (117)  Room Air  





 98.8       





 (RACHEL SMITH MD)





EKG


EKG


[]


 (ADRIAN POSADAS)





Radiology/Procedures


Radiology/Procedures


[]


 (ADRIAN POSADAS)





Course & Med Decision Making


Course & Med Decision Making


Pertinent Labs and Imaging studies reviewed. (See chart for details)





This is a 79-year-old. Patient presented to the ED today with left low back pain

 radiating to the left hip that began a month ago after she fell. No loss of 

consciousness. Left hip x-rays including pelvis, lumbar spine x-rays and femur 

x-rays interpreted by Dr. Gonzalez are negative for any acute findings. Patient 

was provided prescription for cyclobenzaprine. Follow-up with PCP in 1-2 weeks. 

Ice elevation encouraged. Ktracs shows patient received 180 tablets of 

hydrocodone on 7/25/1919, this is a 1 month supply.








 (ADRIAN POSADAS)


Course & Med Decision Making





Staff Physician Addendum:


I was working in the ER during the course of this patient's visit.  I was 

available for consultation as needed, but I was not directly involved in the 

care of this patient.    


 (RACHEL SMITH MD)


Dragon Disclaimer


Dragon Disclaimer


This electronic medical record was generated, in whole or in part, using a voice

 recognition dictation system.


 (ADRIAN POSADAS)





Departure


Departure


Impression:  


   Primary Impression:  


   Fall from standing


   Additional Impression:  


   Sciatica of left side


Disposition:  01 HOME, SELF-CARE


Condition:  STABLE


Referrals:  


ASUNCION RICHARDSON MD (PCP)


follow up as soon as you  can


Patient Instructions:  Sciatica with [facility]





Additional Instructions:  


You were evaluated in the emergency room for back pain radiating to the left 

lower extremity try to ice and elevate the affected area. Take the prescribed 

medication as needed for pain. Please follow-up with your own doctor as soon as 

you can.


Scripts


Cyclobenzaprine Hcl (CYCLOBENZAPRINE HCL) 10 Mg Tablet


1 TAB PO TID, #30 TAB


   Prov: ADRIAN POSADAS         8/20/19





Problem Qualifiers








   Primary Impression:  


   Fall from standing


   Encounter type:  initial encounter  Qualified Codes:  W19.XXXA - Unspecified 

   fall, initial encounter








ADRIAN POSADAS              Aug 20, 2019 18:32


RACHEL SMITH MD            Aug 20, 2019 18:34

## 2019-08-20 NOTE — RAD
Exam: Lumbar spine 2 views left hip 2 views left femur 2 views

 

INDICATION: Fall

 

TECHNIQUE: Frontal and lateral views lumbar spine with spot magnification 

view of the lumbosacral junction. Frontal view of the pelvis with frontal 

and lateral views of the left hip. Frontal and lateral views of the left 

femur.

 

Comparisons: None

 

FINDINGS:

Pelvis:

Bone mineralization is normal. No acute or healed fractures are 

identified. Soft tissues are unremarkable. Joint spaces are 

well-maintained.

 

Femur:

Bone mineralization is normal. No acute or healed fractures. Soft tissues 

are unremarkable. Joint spaces are well-maintained.

 

Lumbar spine:

Vertebral body heights and alignment are well-maintained. Mild 

degenerative disc disease greatest at L5-S1. Bilateral facet arthropathy 

throughout the lumbar spine. Visualized paraspinal soft tissues are 

unremarkable.

 

IMPRESSION:

1.  No acute osseous abnormality of the pelvis

2.  No acute osseous abnormality of the left femur

3.  Mild spondylotic changes lumbar spine as described above.

 

Electronically signed by: Domingo Milian MD (8/20/2019 6:35 PM) [facility]

## 2019-10-22 ENCOUNTER — HOSPITAL ENCOUNTER (EMERGENCY)
Dept: HOSPITAL 61 - ER | Age: 79
Discharge: HOME | End: 2019-10-22
Payer: COMMERCIAL

## 2019-10-22 VITALS — WEIGHT: 157 LBS | HEIGHT: 62.5 IN | BODY MASS INDEX: 28.17 KG/M2

## 2019-10-22 VITALS — SYSTOLIC BLOOD PRESSURE: 150 MMHG | DIASTOLIC BLOOD PRESSURE: 76 MMHG

## 2019-10-22 DIAGNOSIS — M54.32: Primary | ICD-10-CM

## 2019-10-22 DIAGNOSIS — M54.89: ICD-10-CM

## 2019-10-22 DIAGNOSIS — Z90.710: ICD-10-CM

## 2019-10-22 DIAGNOSIS — M79.662: ICD-10-CM

## 2019-10-22 DIAGNOSIS — Z88.0: ICD-10-CM

## 2019-10-22 DIAGNOSIS — G89.29: ICD-10-CM

## 2019-10-22 DIAGNOSIS — I10: ICD-10-CM

## 2019-10-22 DIAGNOSIS — J44.9: ICD-10-CM

## 2019-10-22 DIAGNOSIS — E03.9: ICD-10-CM

## 2019-10-22 DIAGNOSIS — Z88.8: ICD-10-CM

## 2019-10-22 PROCEDURE — 93971 EXTREMITY STUDY: CPT

## 2019-10-22 NOTE — RAD
Exam:  VENOUS LOWER EXTREMITY LEFT 

 

Indication: Calf tenderness, history of blood clots

 

Technique:  Color-flow and pulsed wave duplex ultrasound with compression 

of venous structures of the left lower extremity. 

 

Comparison: None Available.

 

Findings: Duplex ultrasound with compression of the deep venous structures

of the left lower extremity from the common femoral vein through the 

popliteal vein is negative for DVT. The posterior tibial and peroneal 

veins are segmentally visualized and patent where seen. Normal venous 

waveforms and augmentation are noted throughout.

 

Impression: 

 

No evidence for DVT in the left lower extremity.

 

Electronically signed by: Jh Hillman MD (10/22/2019 11:07 AM) 

[facility]

## 2019-10-22 NOTE — PHYS DOC
Past Medical History


Past Medical History:  Arthritis, COPD, Fibromyalgia, Hypertension, Hypothyroid,

Other


Additional Past Medical Histor:  neuropathy,osteoporosis,sleeping 

problem,chronic pain.


 (COURTNEY LINARES)


Past Surgical History:  Hysterectomy, Other


Additional Past Surgical Histo:  PARTIAL THYROIDECTOMY


 (COURTNEY LINARES)


Alcohol Use:  None


Drug Use:  None


 (COURTNEY LINARES)


Attending Signature


I have participated in the care of this patient and I have reviewed and agree 

with all pertinent clinical information above including history, exam, and 

recommendations.





 (VAL FRANCO MD)





Adult General


Chief Complaint


Chief Complaint:  LOWER EXT PAIN





HPI


HPI





Patient is a 79  year old female that presents stating that she ran out of her 

pain pills, weeks ago has been having left calf pain, and left upper leg pain. 

She states that she was taking the pain pills due to sciatic pain. She is 

scheduled to see her primary care doctor tomorrow. Rates her pain at this time 

as 10 out of 10 in severity and sharp.


 (COURTNEY LINARES)





Review of Systems


Review of Systems





Constitutional: Denies fever or chills 


Eyes: Denies change in visual acuity, redness, or eye pain []


HENT: Denies nasal congestion or sore throat []


Respiratory: Denies cough or shortness of breath []


Cardiovascular: No additional information not addressed in HPI []


GI: Denies abdominal pain, nausea, vomiting, bloody stools or diarrhea []


: Denies dysuria or hematuria []


Musculoskeletal: Reports back pain radiating down left leg.


Integument: Reports calf pain.


Neurologic: Denies headache, focal weakness or sensory changes []


Endocrine: Denies polyuria or polydipsia []





Complete systems were reviewed and found to be within normal limits, except as 

documented in this note.


 (COURTNEY LINARES)





Current Medications


Current Medications





Current Medications








 Medications


  (Trade)  Dose


 Ordered  Sig/Bouchra  Start Time


 Stop Time Status Last Admin


Dose Admin


 


 Acetaminophen/


 Hydrocodone Bitart


  (Lortab 10/325)  1 tab  1X  ONCE  10/22/19 10:45


 10/22/19 10:46 DC 10/22/19 11:09


1 TAB





 (VAL FRANCO MD)





Allergies


Allergies





Allergies








Coded Allergies Type Severity Reaction Last Updated Verified


 


  Penicillins Allergy Intermediate Rash 17 Yes


 


  ibuprofen Allergy Intermediate rash 3/13/15 Yes





 (VAL FRANCO MD)





Physical Exam


Physical Exam





Constitutional: Well developed, well nourished, no acute distress, non-toxic 

appearance. []


HENT: Normocephalic, atraumatic, bilateral external ears normal, oropharynx 

moist, no oral exudates, nose normal. []


Eyes: PERRLA, EOMI, conjunctiva normal, no discharge. [] 


Neck: Normal range of motion, no tenderness, supple, no stridor. [] 


Cardiovascular:Heart rate regular rhythm, no murmur []


Lungs & Thorax:  Bilateral breath sounds clear to auscultation []


Abdomen: Bowel sounds normal, soft, no tenderness, no masses, no pulsatile 

masses. [] 


Skin: Warm, dry, no erythema, no rash. [] 


Back:mild tenderness, no CVA tenderness. [] 


Extremities: mild edema and tenderness to L calf. 


Neurologic: Alert and oriented X 3, normal motor function, normal sensory fu

nction, no focal deficits noted. []


Psychologic: Affect normal, judgement normal, mood normal. []


 (COURTNEY LINARES)





Current Patient Data


Vital Signs





                                   Vital Signs








  Date Time  Temp Pulse Resp B/P (MAP) Pulse Ox O2 Delivery O2 Flow Rate FiO2


 


10/22/19 11:27  71 18 150/76 (100) 97 Room Air  


 


10/22/19 10:14 98.8       





 98.8       





 (VAL FRANCO MD)





EKG


EKG


[]


 (COURTNEY LINARES)





Radiology/Procedures


Radiology/Procedures


[][facility]


                    [address] [place] [address]


                                 [phone]


                                        


                                 IMAGING REPORT





                                     Signed





PATIENT: GLENYS MUNOZ  ACCOUNT: [id number]     MRN#: [id number]


: 1940           LOCATION: ER              AGE: 79


SEX: F                    EXAM DT: 10/22/19         ACCESSION#: [id number]


STATUS: REG ER            ORD. PHYSICIAN: COURTNEY LINARES


REASON: calf tenderness, hx of blood clots


PROCEDURE: VENOUS LOWER EXTREMITY LEFT





Exam:  VENOUS LOWER EXTREMITY LEFT 


 


Indication: Calf tenderness, history of blood clots


 


Technique:  Color-flow and pulsed wave duplex ultrasound with compression 


of venous structures of the left lower extremity. 


 


Comparison: None Available.


 


Findings: Duplex ultrasound with compression of the deep venous structures


of the left lower extremity from the common femoral vein through the 


popliteal vein is negative for DVT. The posterior tibial and peroneal 


veins are segmentally visualized and patent where seen. Normal venous 


waveforms and augmentation are noted throughout.


 


Impression: 


 


No evidence for DVT in the left lower extremity.


 


Electronically signed by: Keyona Hillman MD (10/22/2019 11:07 AM) 


[facility]














DICTATED and SIGNED BY:     KEYONA HILLMAN MD


DATE:     10/22/19 1107


 (COURTNEY LINARES)





Course & Med Decision Making


Course & Med Decision Making


Pertinent Labs and Imaging studies reviewed. (See chart for details)





Will get Ultrasound and give supportive care.





Ran a [employer] report on patient and patient last had a narcotic script filled on 

 for 30 days supply of Norco .





Ultrasound is negative. Will d/c home to follow up with primary care.


 (COURTNEY LINARES)





Dragon Disclaimer


Dragon Disclaimer


This electronic medical record was generated, in whole or in part, using a voice

 recognition dictation system.


 (COURTNEY LINARES)





Departure


Departure


Impression:  


   Primary Impression:  


   Pain of left calf


   Additional Impression:  


   Sciatica of left side


Disposition:   HOME, SELF-CARE


Condition:  STABLE


Referrals:  


SUSANNA WORRELL MD (PCP)


Patient Instructions:  Sciatica





Additional Instructions:  


Thank you for visiting [facility]. We appreciate you trusting us 

with your care. If any additional problems come up don't hesitate to return to 

visit us. Please follow up with your primary care provider so they can plan 

additional care if needed and know about the problem that you had. If symptoms 

worsen come back to the Emergency Department. Any concerning symptoms that start

 such as chest pain, shortness of air, weakness or numbness on one side of the 

body, running high fevers or any other concerning symptoms return to the ER.





Problem Qualifiers











COURTNEY LINARES          Oct 22, 2019 10:42


VAL FRANCO MD              Oct 25, 2019 06:09

## 2020-05-19 ENCOUNTER — HOSPITAL ENCOUNTER (EMERGENCY)
Dept: HOSPITAL 61 - ER | Age: 80
Discharge: HOME | End: 2020-05-19
Payer: COMMERCIAL

## 2020-05-19 VITALS — BODY MASS INDEX: 26.94 KG/M2 | WEIGHT: 146.39 LBS | HEIGHT: 62 IN

## 2020-05-19 VITALS — DIASTOLIC BLOOD PRESSURE: 77 MMHG | SYSTOLIC BLOOD PRESSURE: 162 MMHG

## 2020-05-19 DIAGNOSIS — Z87.891: ICD-10-CM
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
## 2020-05-19 DIAGNOSIS — E03.9: ICD-10-CM
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
## 2020-05-19 DIAGNOSIS — G62.9: ICD-10-CM

## 2020-05-19 DIAGNOSIS — G89.29: ICD-10-CM

## 2020-05-19 DIAGNOSIS — M79.7: ICD-10-CM

## 2020-05-19 DIAGNOSIS — I10: ICD-10-CM

## 2020-05-19 DIAGNOSIS — Z88.0: ICD-10-CM
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
## 2020-05-19 DIAGNOSIS — M19.90: ICD-10-CM

## 2020-05-19 DIAGNOSIS — Z90.89: ICD-10-CM

## 2020-05-19 DIAGNOSIS — J44.9: ICD-10-CM

## 2020-05-19 DIAGNOSIS — Z90.710: ICD-10-CM

## 2020-05-19 DIAGNOSIS — Z88.6: ICD-10-CM

## 2020-05-19 DIAGNOSIS — T16.2XXA: Primary | ICD-10-CM

## 2020-05-19 PROCEDURE — 69200 CLEAR OUTER EAR CANAL: CPT

## 2020-05-19 PROCEDURE — 99284 EMERGENCY DEPT VISIT MOD MDM: CPT

## 2020-05-19 NOTE — PHYS DOC
Past Medical History


Past Medical History:  Arthritis, COPD, Fibromyalgia, Hypertension, Hypothyroid,

Other


Additional Past Medical Histor:  neuropathy,osteoporosis,sleeping 

problem,chronic pain.


Past Surgical History:  Hysterectomy, Other


Additional Past Surgical Histo:  PARTIAL THYROIDECTOMY


Smoking Status:  Former Smoker


Alcohol Use:  None


Drug Use:  None





General Adult


EDM:


Chief Complaint:  FOREIGNBODY EAR





HPI:


HPI:


79-year-old female who presents with left ear foreign body.  The patient was 

attempting to place her hearing aid into her left ear, but mistakenly just 

placed a button battery.  Has been present for a couple hours now.  No other 

acute medical complaints.





Review of Systems:


Review of Systems:





ENT: Reports left ear foreign body, otalgia.


Remainder of systems reviewed and negative unless otherwise specified.





Heart Score:


Risk Factors:


Risk Factors:  DM, Current or recent (<one month) smoker, HTN, HLP, family 

history of CAD, obesity.


Risk Scores:


Score 0 - 3:  2.5% MACE over next 6 weeks - Discharge Home


Score 4 - 6:  20.3% MACE over next 6 weeks - Admit for Clinical Observation


Score 7 - 10:  72.7% MACE over next 6 weeks - Early Invasive Strategies





Allergies:


Allergies:





Allergies








Coded Allergies Type Severity Reaction Last Updated Verified


 


  Penicillins Allergy Intermediate Rash 4/5/17 Yes


 


  ibuprofen Allergy Intermediate rash 3/13/15 Yes


 


  morphine Adverse Reaction Intermediate Itching 1/7/20 Yes











Physical Exam:


PE:


Gen: NAD.  


Head: NC/AT. 


Eyes: No scleral icterus. No conjunctival injection. 


ENT: MMM. Posterior OP clear.  Left ear button battery present, removed with 

alligator forceps.  Tympanic membranes clear without rupture.  Mild irritation 

of the left external auditory canal without laceration.


CV: RRR.   


Resp: CTAB.  


MSK: No peripheral cyanosis.  


Neuro: Awake and alert.


Skin. Warm. Dry.  


Psych: Appropriate mood & affect.





EKG:


EKG:


[]





Radiology/Procedures:


Radiology/Procedures:


[]





Course & Med Decision Making:


Course & Med Decision Making


In summary, 79-year-old female who presents with left ear foreign body, removed 

without difficulty with no injury to the external auditory canal or tympanic 

membrane.  Will be discharged home.





Dragon Disclaimer:


Dragon Disclaimer:


This electronic medical record was generated, in whole or in part, using a voice

 recognition dictation system.





Departure


Departure


Impression:  


   Primary Impression:  


   Ear foreign body


Disposition:  01 HOME, SELF-CARE


Condition:  STABLE


Referrals:  


SUSANNA WORRELL MD (PCP)


Patient Instructions:  Ear Foreign Body, Easy-to-Read











EDWARD OLIVEIRA DO                    May 19, 2020 22:03

## 2021-06-21 ENCOUNTER — HOSPITAL ENCOUNTER (EMERGENCY)
Dept: HOSPITAL 61 - ER | Age: 81
Discharge: HOME | End: 2021-06-21
Payer: COMMERCIAL

## 2021-06-21 VITALS — HEIGHT: 63 IN | BODY MASS INDEX: 28.4 KG/M2 | WEIGHT: 160.28 LBS

## 2021-06-21 VITALS — SYSTOLIC BLOOD PRESSURE: 158 MMHG | DIASTOLIC BLOOD PRESSURE: 82 MMHG

## 2021-06-21 DIAGNOSIS — Z88.5: ICD-10-CM

## 2021-06-21 DIAGNOSIS — I10: ICD-10-CM

## 2021-06-21 DIAGNOSIS — E03.9: ICD-10-CM

## 2021-06-21 DIAGNOSIS — K59.03: Primary | ICD-10-CM

## 2021-06-21 DIAGNOSIS — Z88.8: ICD-10-CM

## 2021-06-21 DIAGNOSIS — Z88.0: ICD-10-CM

## 2021-06-21 DIAGNOSIS — J44.9: ICD-10-CM

## 2021-06-21 PROCEDURE — 99284 EMERGENCY DEPT VISIT MOD MDM: CPT

## 2021-06-21 PROCEDURE — 74021 RADEX ABDOMEN 3+ VIEWS: CPT

## 2021-06-21 RX ADMIN — CITROMA MAGNESIUM CITRATE ONE ML: 1.75 LIQUID ORAL at 22:27

## 2021-06-21 RX ADMIN — CITROMA MAGNESIUM CITRATE ONE ML: 1.75 LIQUID ORAL at 22:53

## 2021-06-21 NOTE — RAD
Supine and upright abdomen.



HISTORY: Constipation



Portable supine and upright views were taken of the abdomen. There is elevation left diaphragm. Visua
lized lung bases are otherwise clear. There is no obvious free air. There is moderate stool throughou
t the colon. There is no small bowel obstruction. There is degenerative and hypertrophic change in th
e lumbar spine.



IMPRESSION:

1. Moderate stool throughout the colon and rectum.

2. No bowel obstruction.

3. No other acute finding.

4. Elevated left diaphragm.



Electronically signed by: Frederick Rubalcava MD (6/21/2021 9:18 PM) [facility]

## 2021-06-21 NOTE — PHYS DOC
Past Medical History


Past Medical History:  Arthritis, Constipation, COPD, Fibromyalgia, Hypert

ension, Hypothyroid, Other


Additional Past Medical Histor:  neuropathy,osteoporosis,sleeping problem,chroni

c pain.


 (ADRIAN POSADAS APRN)


Past Surgical History:  Hysterectomy, Other


Additional Past Surgical Histo:  PARTIAL THYROIDECTOMY


 (ADRIAN POSADAS APRN)


Smoking Status:  Never Smoker


Alcohol Use:  None


Drug Use:  None


 (ADRIAN POSADAS APRN)





General Adult


EDM:


Chief Complaint:  CONTISPATION





HPI:


HPI:





Patient is a 81  year old female with a history of COPD on oxygen, hypertension,

anxiety, constipation, among other illnesses who presents to the ED today 

complaining of constipation for 2 days.  Patient states she is on hydrocodone 

for chronic pain.  She states she has not tried any over-the-counter remedies to

help with her constipation.  She states she does not want to feel the pain of 

pushing her bowel movement either.  Denies any nausea, vomiting.  Denies any 

abdominal pain.


 (ADRIAN POSADAS APRN)





Review of Systems:


Review of Systems:


Constitutional:   Denies fever or chills. []


Eyes:   Denies change in visual acuity. []


HENT:   Denies nasal congestion or sore throat. [] 


Respiratory:   Denies cough or shortness of breath. [] 


Cardiovascular:   Denies chest pain or edema. [] 


GI:   Reports constipation.  Denies abdominal pain, nausea, vomiting, bloody 

stools or diarrhea. [] 


:  Denies dysuria. [] 


Musculoskeletal:   Denies back pain or joint pain. [] 


Integument:   Denies rash. [] 


Neurologic:   Denies headache, focal weakness or sensory changes. [] 


Psychiatric:  Denies depression or anxiety. []


 (ADRIAN POSADAS APRN)





Heart Score:


C/O Chest Pain:  N/A


Risk Factors:


Risk Factors:  DM, Current or recent (<one month) smoker, HTN, HLP, family 

history of CAD, obesity.


Risk Scores:


Score 0 - 3:  2.5% MACE over next 6 weeks - Discharge Home


Score 4 - 6:  20.3% MACE over next 6 weeks - Admit for Clinical Observation


Score 7 - 10:  72.7% MACE over next 6 weeks - Early Invasive Strategies


 (ARDIAN POSADAS APRN)





Current Medications:





Current Medications








 Medications


  (Trade)  Dose


 Ordered  Sig/Bouchra  Start Time


 Stop Time Status Last Admin


Dose Admin


 


 Bisacodyl


  (Dulcolax Tab)  10 mg  1X  ONCE  6/21/21 22:00


 6/21/21 22:01 DC 6/21/21 22:27


10 MG


 


 Magnesium Citrate


  (Citroma)  296 ml  1X  ONCE  6/21/21 22:00


 6/21/21 22:01 DC  





 


 Sodium


 Monofluorophosphate


  (Fleet Adult)  133 ml  1X  ONCE  6/21/21 22:00


 6/21/21 22:01 DC 6/21/21 22:27


133 ML








 (ADRIAN POSADAS M APRN)





Allergies:


Allergies:





Allergies








Coded Allergies Type Severity Reaction Last Updated Verified


 


  Penicillins Allergy Intermediate Rash 4/5/17 Yes


 


  ibuprofen Allergy Intermediate rash 3/13/15 Yes


 


  morphine Adverse Reaction Intermediate Itching 1/7/20 Yes








 (ELOYA,ADRIAN M APRN)





Physical Exam:


PE:





Constitutional: Well developed, well nourished, no acute distress, non-toxic abdirahman

earance. []


HENT: Normocephalic, atraumatic, bilateral external ears normal, oropharynx 

moist, no oral exudates, nose normal. []


Eyes: PERRLA, EOMI, conjunctiva normal, no discharge. [] 


Neck: Normal range of motion, no tenderness, supple, no stridor. [] 


Cardiovascular:Heart rate regular rhythm, no murmur []


Lungs & Thorax: Diminished breath sounds, on oxygen


Abdomen: Bowel sounds normal, soft, no tenderness, no masses, no pulsatile 

masses. [] 


Rectal exam, external rectum noted for hemorrhoids, palpable stool noted on the 

lower end of the rectum which was removed by me successfully.


Skin: Warm, dry, no erythema, no rash. [] 


Back: No tenderness, no CVA tenderness. [] 


Extremities: No tenderness, no cyanosis, no clubbing, ROM intact, no edema. [] 


Neurologic: Alert and oriented X 3, normal motor function, normal sensory 

function, no focal deficits noted. []


Psychologic: Affect normal, judgement normal, mood normal. []


 (MORTEZAUNGA,ADRIAN M APRN)





Current Patient Data:


Vital Signs:





                                   Vital Signs








  Date Time  Temp Pulse Resp B/P (MAP) Pulse Ox O2 Delivery O2 Flow Rate FiO2


 


6/21/21 20:00 98.0 92 18 158/82 (107) 98   





 98.0       








 (MUTUNGA,ADRIAN M APRN)





EKG:


EKG:


[]


 (ADRIAN POSADAS)





Radiology/Procedures:


Radiology/Procedures:


[]PROCEDURE: ABDOMEN SUPINE & UPRIGHT





Supine and upright abdomen.





HISTORY: Constipation





Portable supine and upright views were taken of the abdomen. There is elevation 

left diaphragm. Visualized lung bases are otherwise clear. There is no obvious 

free air. There is moderate stool throughout the colon. There is no small bowel 

obstruction. There is degenerative and hypertrophic change in the lumbar spine.





IMPRESSION:


1. Moderate stool throughout the colon and rectum.


2. No bowel obstruction.


3. No other acute finding.


4. Elevated left diaphragm.





Electronically signed by: Frederick Rubalcava MD (6/21/2021 9:18 PM) [facility]














DICTATED and SIGNED BY:     FREDERICK RUBALCAVA MD


DATE:     06/21/21 [id number] 0


 (ADRIAN POSADAS)





Course & Med Decision Making:


Course & Med Decision Making


Pertinent Labs and Imaging studies reviewed. (See chart for details)





This is a 81-year-old female patient presented to the ED today complaining of 

constipation for 2 days.  Patient is on hydrocodone and states she did not try 

anything to help with her constipation.





I did a rectal exam and removed quite a bit of stool from the lower rectum 

region.  X-ray of the abdomen noted for moderate amount of stool in the rectum. 

 Patient was given Dulcolax and mag citrate in the ED, Fleet enema was 

performed.  Patient herself is not making any effort to have a bowel movement.  

Talk to patient's son about prevention as well as management of constipation.  

Discharge to home








 (ADRIAN POSADAS)


Course & Med Decision Making


Patients Care and treatment plan provided by ER Nurse Practitioner.  I was 

available for consult.  Patient's chart reviewed.


 (ANASTASIIA VEGAS DO)


Dragon Disclaimer:


Dragon Disclaimer:


This electronic medical record was generated, in whole or in part, using a voice

 recognition dictation system.


 (ADRIAN POSADAS)





Departure


Departure


Impression:  


   Primary Impression:  


   Constipation


   Qualified Codes:  K59.03 - Drug induced constipation


Disposition:  01 HOME / SELF CARE / HOMELESS


Condition:  STABLE


Referrals:  


SUSANNA WORRELL MD (PCP)


Follow-up in the course of this week


Patient Instructions:  Constipation, Adult





Additional Instructions:  


You were seen for constipation, you need to increase your dietary fiber intake 

as well as water intake.  Take MiraLAX every day as well as docusate sodium.  

Anytime you are constipated take magnesium citrate or milk of magnesium.  You 

can also do an enema whenever you are constipated


Scripts


Na Phos,M-B/Na Phos,Di-Ba (FLEET ENEMA) 133 Ml Enema


1 EACH RC ONCE, #1 BOTTLE


   Prov: ADRIAN POSADAS         6/21/21 


Docusate Sodium (DOCUSATE SODIUM) 100 Mg Capsule


1 CAP PO BID for constipation for 15 Days, #30 CAP 0 Refills


   Prov: ADRIAN POSADAS         6/21/21 


Polyethylene Glycol 3350 (MIRALAX) 119 Gm Powder


17 GM PO DAILY for constipation, #527 GM 0 Refills


   dissolve in water


   Prov: ADRIAN POSADAS         6/21/21 


Magnesium Citrate (MAGNESIUM CITRATE) 296 Ml Solution


296 ML PO ONCE, #296 ML


   Prov: ADRIAN POSADAS         6/21/21











ADRIAN POSADAS            Jun 21, 2021 22:42


ANASTASIIA VEGAS DO            Jun 22, 2021 05:07

## 2021-11-28 ENCOUNTER — HOSPITAL ENCOUNTER (INPATIENT)
Dept: HOSPITAL 61 - ER | Age: 81
LOS: 4 days | Discharge: NURSING, REHAB OR LONG-TERM CARE | DRG: 884 | End: 2021-12-02
Attending: STUDENT IN AN ORGANIZED HEALTH CARE EDUCATION/TRAINING PROGRAM | Admitting: INTERNAL MEDICINE
Payer: COMMERCIAL

## 2021-11-28 VITALS — WEIGHT: 150.13 LBS | HEIGHT: 64 IN | BODY MASS INDEX: 25.63 KG/M2

## 2021-11-28 DIAGNOSIS — M79.7: ICD-10-CM

## 2021-11-28 DIAGNOSIS — Z20.822: ICD-10-CM

## 2021-11-28 DIAGNOSIS — M19.90: ICD-10-CM

## 2021-11-28 DIAGNOSIS — Z87.891: ICD-10-CM

## 2021-11-28 DIAGNOSIS — D32.9: ICD-10-CM

## 2021-11-28 DIAGNOSIS — E03.9: ICD-10-CM

## 2021-11-28 DIAGNOSIS — Z88.8: ICD-10-CM

## 2021-11-28 DIAGNOSIS — G89.29: ICD-10-CM

## 2021-11-28 DIAGNOSIS — Z82.49: ICD-10-CM

## 2021-11-28 DIAGNOSIS — F03.90: Primary | ICD-10-CM

## 2021-11-28 DIAGNOSIS — J44.9: ICD-10-CM

## 2021-11-28 DIAGNOSIS — E78.5: ICD-10-CM

## 2021-11-28 DIAGNOSIS — Z88.0: ICD-10-CM

## 2021-11-28 DIAGNOSIS — Z90.710: ICD-10-CM

## 2021-11-28 DIAGNOSIS — M81.0: ICD-10-CM

## 2021-11-28 DIAGNOSIS — I10: ICD-10-CM

## 2021-11-28 LAB
ALBUMIN SERPL-MCNC: 3.4 G/DL (ref 3.4–5)
ALBUMIN/GLOB SERPL: 0.7 {RATIO} (ref 1–1.7)
ALP SERPL-CCNC: 93 U/L (ref 46–116)
ALT SERPL-CCNC: 15 U/L (ref 14–59)
ANION GAP SERPL CALC-SCNC: 14 MMOL/L (ref 6–14)
APTT PPP: YELLOW S
AST SERPL-CCNC: 31 U/L (ref 15–37)
BACTERIA #/AREA URNS HPF: 0 /HPF
BASOPHILS # BLD AUTO: 0.1 X10^3/UL (ref 0–0.2)
BASOPHILS NFR BLD: 1 % (ref 0–3)
BILIRUB SERPL-MCNC: 0.5 MG/DL (ref 0.2–1)
BILIRUB UR QL STRIP: (no result)
BUN SERPL-MCNC: 18 MG/DL (ref 7–20)
BUN/CREAT SERPL: 36 (ref 6–20)
CALCIUM SERPL-MCNC: 10.1 MG/DL (ref 8.5–10.1)
CHLORIDE SERPL-SCNC: 99 MMOL/L (ref 98–107)
CO2 SERPL-SCNC: 29 MMOL/L (ref 21–32)
CREAT SERPL-MCNC: 0.5 MG/DL (ref 0.6–1)
EOSINOPHIL NFR BLD: 0.1 X10^3/UL (ref 0–0.7)
EOSINOPHIL NFR BLD: 2 % (ref 0–3)
ERYTHROCYTE [DISTWIDTH] IN BLOOD BY AUTOMATED COUNT: 18.6 % (ref 11.5–14.5)
FIBRINOGEN PPP-MCNC: CLEAR MG/DL
GFR SERPLBLD BASED ON 1.73 SQ M-ARVRAT: 143.6 ML/MIN
GLUCOSE SERPL-MCNC: 90 MG/DL (ref 70–99)
HCT VFR BLD CALC: 33.1 % (ref 36–47)
HGB BLD-MCNC: 10.9 G/DL (ref 12–15.5)
LYMPHOCYTES # BLD: 1.2 X10^3/UL (ref 1–4.8)
LYMPHOCYTES NFR BLD AUTO: 16 % (ref 24–48)
MCH RBC QN AUTO: 28 PG (ref 25–35)
MCHC RBC AUTO-ENTMCNC: 33 G/DL (ref 31–37)
MCV RBC AUTO: 84 FL (ref 79–100)
MONO #: 0.8 X10^3/UL (ref 0–1.1)
MONOCYTES NFR BLD: 11 % (ref 0–9)
NEUT #: 5.3 X10^3/UL (ref 1.8–7.7)
NEUTROPHILS NFR BLD AUTO: 71 % (ref 31–73)
NITRITE UR QL STRIP: NEGATIVE
PH UR STRIP: 6 [PH]
PLATELET # BLD AUTO: 435 X10^3/UL (ref 140–400)
POTASSIUM SERPL-SCNC: 4.2 MMOL/L (ref 3.5–5.1)
PROT SERPL-MCNC: 8.3 G/DL (ref 6.4–8.2)
PROT UR STRIP-MCNC: 100 MG/DL
RBC # BLD AUTO: 3.92 X10^6/UL (ref 3.5–5.4)
RBC #/AREA URNS HPF: 0 /HPF (ref 0–2)
SODIUM SERPL-SCNC: 142 MMOL/L (ref 136–145)
UROBILINOGEN UR-MCNC: 1 MG/DL
WBC # BLD AUTO: 7.5 X10^3/UL (ref 4–11)
WBC #/AREA URNS HPF: 0 /HPF (ref 0–4)

## 2021-11-28 PROCEDURE — 84425 ASSAY OF VITAMIN B-1: CPT

## 2021-11-28 PROCEDURE — 71045 X-RAY EXAM CHEST 1 VIEW: CPT

## 2021-11-28 PROCEDURE — 82550 ASSAY OF CK (CPK): CPT

## 2021-11-28 PROCEDURE — 81001 URINALYSIS AUTO W/SCOPE: CPT

## 2021-11-28 PROCEDURE — 82607 VITAMIN B-12: CPT

## 2021-11-28 PROCEDURE — 80053 COMPREHEN METABOLIC PANEL: CPT

## 2021-11-28 PROCEDURE — 36415 COLL VENOUS BLD VENIPUNCTURE: CPT

## 2021-11-28 PROCEDURE — 96361 HYDRATE IV INFUSION ADD-ON: CPT

## 2021-11-28 PROCEDURE — G0378 HOSPITAL OBSERVATION PER HR: HCPCS

## 2021-11-28 PROCEDURE — U0003 INFECTIOUS AGENT DETECTION BY NUCLEIC ACID (DNA OR RNA); SEVERE ACUTE RESPIRATORY SYNDROME CORONAVIRUS 2 (SARS-COV-2) (CORONAVIRUS DISEASE [COVID-19]), AMPLIFIED PROBE TECHNIQUE, MAKING USE OF HIGH THROUGHPUT TECHNOLOGIES AS DESCRIBED BY CMS-2020-01-R: HCPCS

## 2021-11-28 PROCEDURE — 87426 SARSCOV CORONAVIRUS AG IA: CPT

## 2021-11-28 PROCEDURE — 94640 AIRWAY INHALATION TREATMENT: CPT

## 2021-11-28 PROCEDURE — 96374 THER/PROPH/DIAG INJ IV PUSH: CPT

## 2021-11-28 PROCEDURE — 70450 CT HEAD/BRAIN W/O DYE: CPT

## 2021-11-28 PROCEDURE — 85025 COMPLETE CBC W/AUTO DIFF WBC: CPT

## 2021-11-28 NOTE — PHYS DOC
Past Medical History


Past Medical History:  Arthritis, Constipation, COPD, Fibromyalgia, Hypert

ension, Hypothyroid, Other


Additional Past Medical Histor:  neuropathy,osteoporosis,sleeping problem,chroni

c pain,


Past Surgical History:  Hysterectomy, Other


Additional Past Surgical Histo:  PARTIAL THYROIDECTOMY


Smoking Status:  Former Smoker


Alcohol Use:  None


Drug Use:  None





General Adult


EDM:


Chief Complaint:  ALTERED MENTAL STATUS





HPI:


HPI:





Patient is a 81  year old female presents for evaluation due to altered mental 

status.


Patient has past medical history of dementia.


She lives at home alone.





Son found patient in bed this afternoon.


He states patient seems more confused than normal.





On exam patient follows commands.


States her name but unable to provide place or time.





Review of Systems:


Review of Systems:


Unable to obtain history due to altered mental status





Heart Score:


C/O Chest Pain:  N/A


Risk Factors:


Risk Factors:  DM, Current or recent (<one month) smoker, HTN, HLP, family 

history of CAD, obesity.


Risk Scores:


Score 0 - 3:  2.5% MACE over next 6 weeks - Discharge Home


Score 4 - 6:  20.3% MACE over next 6 weeks - Admit for Clinical Observation


Score 7 - 10:  72.7% MACE over next 6 weeks - Early Invasive Strategies





Current Medications:





Current Medications








 Medications


  (Trade)  Dose


 Ordered  Sig/Bouchra  Start Time


 Stop Time Status Last Admin


Dose Admin


 


 Sodium Chloride  1,000 ml @ 


 1,000 mls/hr  1X  ONCE  11/28/21 19:15


 11/28/21 20:14   














Allergies:


Allergies:





Allergies








Coded Allergies Type Severity Reaction Last Updated Verified


 


  Penicillins Allergy Intermediate Rash 4/5/17 Yes


 


  ibuprofen Allergy Intermediate rash 3/13/15 Yes


 


  morphine Adverse Reaction Intermediate Itching 1/7/20 Yes











Physical Exam:


PE:


General: alert, no acute distress.


Skin: warm, dry and intact, no erythema, no rash. 


HENT: bilateral external ears normal, oropharynx moist, nose normal.


Head::  Normocephalic, atraumatic.


Neck:   Trachea midline.


Eyes: EOMI, Normal conjunctiva, No drainage


CARDIOVASCULAR:  Regular rate and rhythm


RESPIRATORY:  No respiratory distress


Back:  Full range of motion.


MUSCULOSKELETAL:  Full range of motion of bilateral upper and lower extremities.


GASTROINTESTINAL: Abdomen soft without rebound or guarding.


NEUROLOGICAL:  Alert nonverbal but follows commands





Current Patient Data:


Vital Signs:





                                   Vital Signs








  Date Time  Temp Pulse Resp B/P (MAP) Pulse Ox O2 Delivery O2 Flow Rate FiO2


 


11/28/21 18:40 97.5 88 22 174/83 (113) 100 Nasal Cannula 2.0 





 97.5       











EKG:


EKG:


[]





Radiology/Procedures:


Radiology/Procedures:


[]





Course & Med Decision Making:


Course & Med Decision Making


Pertinent Labs and Imaging studies reviewed. (See chart for details)





Dragon Disclaimer:


Dragon Disclaimer:


This electronic medical record was generated, in whole or in part, using a voice

 recognition dictation system.





Departure


Departure


Impression:  


   Primary Impression:  


   AMS (altered mental status)


   Additional Impression:  


   Dementia


Disposition:  09 ADMITTED AS INPATIENT


Admitting Physician:  Susanna Miller


Referrals:  


SUSANNA MILELR MD (PCP)











ANASTASIIA VEGAS DO            Nov 28, 2021 19:11

## 2021-11-28 NOTE — RAD
Exam: CT head



INDICATION: Altered mental status



TECHNIQUE: Sequential axial images through the head were obtained without the administration of IV co
ntrast.



Exposure: One or more of the following in the visualized dose reduction techniques were utilized for 
this examination:

1.  Automated exposure control

2.  Adjustment of the MA and/or KV according to patient size

3.  Use of iterative of reconstructive technique





Comparisons: 7/13/2021



FINDINGS:

There is a mildly hyperintense rounded mass at the midline anterior cranial fossa measuring approxima
tely 3.8 x 3.0 cm. This is stable from prior exams. There is no midline shift or sulcal effacement.



No acute vascular territory infarction is identified. Gray-white distinction is preserved.



Patchy evidence in the periventricular white matter, similar to prior study. The ventricular system i
s within normal limits without compression hydrocephalus. The basal cisterns are well maintained.



The visualized portions of the paranasal sinuses and mastoid air cells are well-pneumatized. No acute
 fractures.



IMPRESSION:

Chronic changes without acute intracranial abnormality.



Electronically signed by: Domingo Milian MD (11/28/2021 10:16 PM) [facility]

## 2021-11-29 VITALS — SYSTOLIC BLOOD PRESSURE: 177 MMHG | DIASTOLIC BLOOD PRESSURE: 87 MMHG

## 2021-11-29 VITALS — SYSTOLIC BLOOD PRESSURE: 108 MMHG | DIASTOLIC BLOOD PRESSURE: 57 MMHG

## 2021-11-29 VITALS — SYSTOLIC BLOOD PRESSURE: 128 MMHG | DIASTOLIC BLOOD PRESSURE: 65 MMHG

## 2021-11-29 VITALS — SYSTOLIC BLOOD PRESSURE: 165 MMHG | DIASTOLIC BLOOD PRESSURE: 80 MMHG

## 2021-11-29 VITALS — SYSTOLIC BLOOD PRESSURE: 162 MMHG | DIASTOLIC BLOOD PRESSURE: 94 MMHG

## 2021-11-29 VITALS — SYSTOLIC BLOOD PRESSURE: 141 MMHG | DIASTOLIC BLOOD PRESSURE: 66 MMHG

## 2021-11-29 VITALS — SYSTOLIC BLOOD PRESSURE: 155 MMHG | DIASTOLIC BLOOD PRESSURE: 90 MMHG

## 2021-11-29 LAB
ALBUMIN SERPL-MCNC: 3 G/DL (ref 3.4–5)
ALBUMIN/GLOB SERPL: 0.8 {RATIO} (ref 1–1.7)
ALP SERPL-CCNC: 86 U/L (ref 46–116)
ALT SERPL-CCNC: 12 U/L (ref 14–59)
ANION GAP SERPL CALC-SCNC: 13 MMOL/L (ref 6–14)
AST SERPL-CCNC: 17 U/L (ref 15–37)
BASOPHILS # BLD AUTO: 0 X10^3/UL (ref 0–0.2)
BASOPHILS NFR BLD: 1 % (ref 0–3)
BILIRUB SERPL-MCNC: 0.4 MG/DL (ref 0.2–1)
BUN SERPL-MCNC: 14 MG/DL (ref 7–20)
BUN/CREAT SERPL: 35 (ref 6–20)
CALCIUM SERPL-MCNC: 9.4 MG/DL (ref 8.5–10.1)
CHLORIDE SERPL-SCNC: 103 MMOL/L (ref 98–107)
CO2 SERPL-SCNC: 30 MMOL/L (ref 21–32)
CREAT SERPL-MCNC: 0.4 MG/DL (ref 0.6–1)
EOSINOPHIL NFR BLD: 0.2 X10^3/UL (ref 0–0.7)
EOSINOPHIL NFR BLD: 3 % (ref 0–3)
ERYTHROCYTE [DISTWIDTH] IN BLOOD BY AUTOMATED COUNT: 18.7 % (ref 11.5–14.5)
GFR SERPLBLD BASED ON 1.73 SQ M-ARVRAT: 185.8 ML/MIN
GLUCOSE SERPL-MCNC: 82 MG/DL (ref 70–99)
HCT VFR BLD CALC: 30.4 % (ref 36–47)
HGB BLD-MCNC: 9.7 G/DL (ref 12–15.5)
LYMPHOCYTES # BLD: 1.1 X10^3/UL (ref 1–4.8)
LYMPHOCYTES NFR BLD AUTO: 18 % (ref 24–48)
MCH RBC QN AUTO: 27 PG (ref 25–35)
MCHC RBC AUTO-ENTMCNC: 32 G/DL (ref 31–37)
MCV RBC AUTO: 86 FL (ref 79–100)
MONO #: 0.7 X10^3/UL (ref 0–1.1)
MONOCYTES NFR BLD: 10 % (ref 0–9)
NEUT #: 4.3 X10^3/UL (ref 1.8–7.7)
NEUTROPHILS NFR BLD AUTO: 68 % (ref 31–73)
PLATELET # BLD AUTO: 430 X10^3/UL (ref 140–400)
POTASSIUM SERPL-SCNC: 3.6 MMOL/L (ref 3.5–5.1)
PROT SERPL-MCNC: 6.7 G/DL (ref 6.4–8.2)
RBC # BLD AUTO: 3.54 X10^6/UL (ref 3.5–5.4)
SODIUM SERPL-SCNC: 146 MMOL/L (ref 136–145)
WBC # BLD AUTO: 6.4 X10^3/UL (ref 4–11)

## 2021-11-29 RX ADMIN — BUPROPION HYDROCHLORIDE SCH MG: 150 TABLET, FILM COATED, EXTENDED RELEASE ORAL at 09:46

## 2021-11-29 RX ADMIN — BUDESONIDE SCH MG: 0.5 INHALANT RESPIRATORY (INHALATION) at 12:03

## 2021-11-29 RX ADMIN — GABAPENTIN SCH MG: 300 CAPSULE ORAL at 12:24

## 2021-11-29 RX ADMIN — DONEPEZIL HYDROCHLORIDE SCH MG: 5 TABLET, FILM COATED ORAL at 09:46

## 2021-11-29 RX ADMIN — DOCUSATE SODIUM SCH MG: 100 CAPSULE, LIQUID FILLED ORAL at 09:46

## 2021-11-29 RX ADMIN — ATORVASTATIN CALCIUM SCH MG: 10 TABLET, FILM COATED ORAL at 21:04

## 2021-11-29 RX ADMIN — GABAPENTIN SCH MG: 300 CAPSULE ORAL at 21:04

## 2021-11-29 RX ADMIN — ALBUTEROL SULFATE SCH MG: 108 AEROSOL, METERED RESPIRATORY (INHALATION) at 12:04

## 2021-11-29 RX ADMIN — ASPIRIN SCH MG: 81 TABLET, COATED ORAL at 09:46

## 2021-11-29 RX ADMIN — POLYETHYLENE GLYCOL 3350 SCH GM: 17 POWDER, FOR SOLUTION ORAL at 09:45

## 2021-11-29 RX ADMIN — ALBUTEROL SULFATE SCH MG: 108 AEROSOL, METERED RESPIRATORY (INHALATION) at 19:55

## 2021-11-29 RX ADMIN — GABAPENTIN SCH MG: 300 CAPSULE ORAL at 09:46

## 2021-11-29 RX ADMIN — TIZANIDINE PRN MG: 4 TABLET ORAL at 21:30

## 2021-11-29 RX ADMIN — BUDESONIDE SCH MG: 0.5 INHALANT RESPIRATORY (INHALATION) at 16:58

## 2021-11-29 RX ADMIN — LISINOPRIL SCH MG: 20 TABLET ORAL at 09:46

## 2021-11-29 RX ADMIN — DOCUSATE SODIUM SCH MG: 100 CAPSULE, LIQUID FILLED ORAL at 21:04

## 2021-11-29 RX ADMIN — ALBUTEROL SULFATE SCH MG: 108 AEROSOL, METERED RESPIRATORY (INHALATION) at 16:58

## 2021-11-29 NOTE — PDOC
PROGRESS NOTES


Date of Service:


DATE: 11/29/21 


TIME: 08:44





Objective


Objective





Vital Signs








  Date Time  Temp Pulse Resp B/P (MAP) Pulse Ox O2 Delivery O2 Flow Rate FiO2


 


11/29/21 08:00      Nasal Cannula 2.0 


 


11/29/21 03:53 97.5 96 18 165/80 (108) 98   





 97.5       











Physical Exam


MUSCULOSKELETAL:  Osteoarthritic changes both hands





Diagnosis


Problem List


Problems


Medical Problems:


(1) Dementia


Status: Acute  











Assessment


Assessment


Problems


Medical Problems:


(1) Dementia


Status: Acute  











Plan


Plan of Care


Problems


Medical Problems:


(1) Dementia


Status: Acute  











Comment


Review of Relevant


I have reviewed the following items unique (where applicable) has been applied.


Labs





Laboratory Tests








Test


 11/28/21


18:59 11/28/21


19:15 11/28/21


21:45 11/29/21


07:30


 


White Blood Count


 7.5 x10^3/uL


(4.0-11.0) 


 


 6.4 x10^3/uL


(4.0-11.0)


 


Red Blood Count


 3.92 x10^6/uL


(3.50-5.40) 


 


 3.54 x10^6/uL


(3.50-5.40)


 


Hemoglobin


 10.9 g/dL


(12.0-15.5) 


 


 9.7 g/dL


(12.0-15.5)


 


Hematocrit


 33.1 %


(36.0-47.0) 


 


 30.4 %


(36.0-47.0)


 


Mean Corpuscular Volume 84 fL ()    86 fL () 


 


Mean Corpuscular Hemoglobin 28 pg (25-35)    27 pg (25-35) 


 


Mean Corpuscular Hemoglobin


Concent 33 g/dL


(31-37) 


 


 32 g/dL


(31-37)


 


Red Cell Distribution Width


 18.6 %


(11.5-14.5) 


 


 18.7 %


(11.5-14.5)


 


Platelet Count


 435 x10^3/uL


(140-400) 


 


 430 x10^3/uL


(140-400)


 


Neutrophils (%) (Auto) 71 % (31-73)    68 % (31-73) 


 


Lymphocytes (%) (Auto) 16 % (24-48)    18 % (24-48) 


 


Monocytes (%) (Auto) 11 % (0-9)    10 % (0-9) 


 


Eosinophils (%) (Auto) 2 % (0-3)    3 % (0-3) 


 


Basophils (%) (Auto) 1 % (0-3)    1 % (0-3) 


 


Neutrophils # (Auto)


 5.3 x10^3/uL


(1.8-7.7) 


 


 4.3 x10^3/uL


(1.8-7.7)


 


Lymphocytes # (Auto)


 1.2 x10^3/uL


(1.0-4.8) 


 


 1.1 x10^3/uL


(1.0-4.8)


 


Monocytes # (Auto)


 0.8 x10^3/uL


(0.0-1.1) 


 


 0.7 x10^3/uL


(0.0-1.1)


 


Eosinophils # (Auto)


 0.1 x10^3/uL


(0.0-0.7) 


 


 0.2 x10^3/uL


(0.0-0.7)


 


Basophils # (Auto)


 0.1 x10^3/uL


(0.0-0.2) 


 


 0.0 x10^3/uL


(0.0-0.2)


 


Sodium Level


 142 mmol/L


(136-145) 


 


 146 mmol/L


(136-145)


 


Potassium Level


 4.2 mmol/L


(3.5-5.1) 


 


 3.6 mmol/L


(3.5-5.1)


 


Chloride Level


 99 mmol/L


() 


 


 103 mmol/L


()


 


Carbon Dioxide Level


 29 mmol/L


(21-32) 


 


 30 mmol/L


(21-32)


 


Anion Gap 14 (6-14)    13 (6-14) 


 


Blood Urea Nitrogen


 18 mg/dL


(7-20) 


 


 14 mg/dL


(7-20)


 


Creatinine


 0.5 mg/dL


(0.6-1.0) 


 


 0.4 mg/dL


(0.6-1.0)


 


Estimated GFR


(Cockcroft-Gault) 143.6 


 


 


 185.8 





 


BUN/Creatinine Ratio 36 (6-20)    35 (6-20) 


 


Glucose Level


 90 mg/dL


(70-99) 


 


 82 mg/dL


(70-99)


 


Calcium Level


 10.1 mg/dL


(8.5-10.1) 


 


 9.4 mg/dL


(8.5-10.1)


 


Total Bilirubin


 0.5 mg/dL


(0.2-1.0) 


 


 0.4 mg/dL


(0.2-1.0)


 


Aspartate Amino Transf


(AST/SGOT) 31 U/L (15-37) 


 


 


 17 U/L (15-37) 





 


Alanine Aminotransferase


(ALT/SGPT) 15 U/L (14-59) 


 


 


 12 U/L (14-59) 





 


Alkaline Phosphatase


 93 U/L


() 


 


 86 U/L


()


 


Creatine Kinase


 89 U/L


() 


 


 





 


Total Protein


 8.3 g/dL


(6.4-8.2) 


 


 6.7 g/dL


(6.4-8.2)


 


Albumin


 3.4 g/dL


(3.4-5.0) 


 


 3.0 g/dL


(3.4-5.0)


 


Albumin/Globulin Ratio 0.7 (1.0-1.7)    0.8 (1.0-1.7) 


 


SARS-CoV-2 Antigen (Rapid)


 


 Negative


(NEGATIVE) 


 





 


Urine Collection Type   U cath  


 


Urine Color   Yellow  


 


Urine Clarity   Clear  


 


Urine pH   6.0 (<5.0-8.0)  


 


Urine Specific Gravity


 


 


 1.025


(1.000-1.030) 





 


Urine Protein


 


 


 100 mg/dL


(NEG-TRACE) 





 


Urine Glucose (UA)


 


 


 Negative mg/dL


(NEG) 





 


Urine Ketones (Stick)


 


 


 >=80 mg/dL


(NEG) 





 


Urine Blood   Negative (NEG)  


 


Urine Nitrite   Negative (NEG)  


 


Urine Bilirubin   Moderate (NEG)  


 


Urine Urobilinogen Dipstick


 


 


 1.0 mg/dL (0.2


mg/dL) 





 


Urine Leukocyte Esterase   Negative (NEG)  


 


Urine RBC   0 /HPF (0-2)  


 


Urine WBC   0 /HPF (0-4)  


 


Urine Squamous Epithelial


Cells 


 


 Few /LPF 


 





 


Urine Bacteria   0 /HPF (0-FEW)  


 


Urine Mucus   Mod /LPF  








Medications





Current Medications


Info (FLU VACCINE SCREEN per RX) 1 each 1X  ONCE MC ;  Start 11/29/21 at 09:00; 

Stop 11/29/21 at 09:01


Lorazepam (Ativan Inj) 1 mg 1X  ONCE IVP  Last administered on 11/28/21at 21:35;

 Start 11/28/21 at 21:00;  Stop 11/28/21 at 21:01;  Status DC


Sodium Chloride 1,000 ml @  1,000 mls/hr 1X  ONCE IV  Last administered on 

11/28/21at 19:22;  Start 11/28/21 at 19:15;  Stop 11/28/21 at 20:14;  Status DC


Vitals/I & O





Vital Sign - Last 24 Hours








 11/28/21 11/28/21 11/28/21 11/28/21





 18:40 19:01 19:16 19:31


 


Temp 97.5   





 97.5   


 


Pulse 88 84 90 86


 


Resp 22 24 31 30


 


B/P (MAP) 174/83 (113) 185/85 (118) 175/67 (103) 186/81 (116)


 


Pulse Ox 100 100 100 100


 


O2 Delivery Nasal Cannula Nasal Cannula Nasal Cannula Nasal Cannula


 


O2 Flow Rate 2.0 2.0 2.0 2.0


 


    





    





 11/28/21 11/28/21 11/28/21 11/28/21





 20:01 21:15 21:34 22:05


 


Pulse 78 80 78 86


 


Resp 24 15 24 24


 


B/P (MAP) 190/90 (123) 202/91 (128) 180/84 (116) 175/77 (109)


 


Pulse Ox 100 100 97 97


 


O2 Delivery Nasal Cannula Nasal Cannula Nasal Cannula Nasal Cannula


 


O2 Flow Rate 2.0 2.0 2.0 2.0





 11/28/21 11/29/21 11/29/21 11/29/21





 22:59 01:00 02:46 03:53


 


Temp   97.8 97.5





   97.8 97.5


 


Pulse 86  90 96


 


Resp 18  17 18


 


B/P (MAP) 160/87 (111)  177/87 (117) 165/80 (108)


 


Pulse Ox 99  100 98


 


O2 Delivery Nasal Cannula Nasal Cannula Nasal Cannula Nasal Cannula


 


O2 Flow Rate 2.0 2.0 2.0 2.0


 


    





    





 11/29/21   





 08:00   


 


O2 Delivery Nasal Cannula   


 


O2 Flow Rate 2.0   











Justifications for Admission


Other Justification














SUSANNA WORRELL MD             Nov 29, 2021 08:44

## 2021-11-29 NOTE — NUR
Pt admitted to unit @ 1am. Called registration that admitting physician is Dr Miller. Pt 
with confusion. Limited admission information from patient. Most information was from report 
and previous admission. Patient resistive at times to care.

## 2021-11-29 NOTE — HP
DATE OF SERVICE: 11/29/2021

ADMIT DATE: 11/28/2021

REASON FOR ADMISSION TO THE HOSPITAL:  Confusion, worsening dementia.



HISTORY OF PRESENT ILLNESS:  The patient is an 81-year-old female.  The patient 

was last seen close to 6 months ago.  She has a progressive dementia, not seen 

in the office.  She has been taken care of at home.  She has a caregiver, which 

come 24 hours a day and lately one of the caregivers were sick, they are trying 

to find the substitute and she was having more agitation, confusion and falls.  

The patient does not use a walker and the patient was admitted to the hospital 

for further evaluation.  CT head was done.



PAST MEDICAL HISTORY:  History of hypertension, COPD, constipation, arthritis, 

fibromyalgia, hyperthyroid problems, osteoporosis, chronic pain.



PAST SURGICAL HISTORY:  Hysterectomy, partial thyroidectomy.



SOCIAL HISTORY:  Former smoker.  Denies alcohol or drug abuse.



ALLERGIES:  PENICILLIN, IBUPROFEN, AND MORPHINE.



MEDICATIONS AT HOME:  These were the last medications, Tylenol, albuterol 

inhaler, amlodipine 10 mg, aspirin 81 mg, atorvastatin 10 mg, benazepril 40 mg, 

Symbicort 140/4.5 twice a day, bupropion 150 mg, clonazepam 0.5 twice a day, 

Colace 100 mg twice a day, Aricept 5 mg daily, duloxetine 60 mg daily, 

gabapentin 300 mg 3 times daily, MiraLax 17 grams daily, prednisone 5 mg daily, 

and tizanidine 4 mg p.r.n.



REVIEW OF SYSTEMS:  The patient is awake, says I do not want anything.  The 

patient does not remember me.  She says her name, but she is not oriented to the

time or to the place.



PHYSICAL EXAMINATION:

VITAL SIGNS:  At the time of admission shows temperature 97, pulse 88, 

respirations 22, blood pressure 124/83, 100% on 2 liters.

HEENT:  Head is atraumatic.  Pupils equal.  Oral cavity, no teeth.

NECK:  Supple.

CHEST:  Symmetrical.

CARDIOVASCULAR:  S1, S2.

LUNGS:  Clear to auscultation.

ABDOMEN:  Soft.  No mass palpable.

EXTERNAL GENITALIA:  Deferred.

EXTREMITIES:  No calf tenderness, no edema.

NEUROLOGIC:  The patient is awake.  The patient does not want to be bothered, 

says I do not know.  She says she does not remember me.  She knows her name, not

oriented to time or place or to the ER.



LABORATORY DATA:  Laboratory data shows a white count of 7, hemoglobin 11, 

platelets 435.  Electrolytes show sodium 142, potassium 4.2, chloride 99, 

bicarbonate 29, anion gap 14, BUN 18, creatinine 0.5, glucose 90.  LFTs normal. 

Urine negative for infection.  Serology was negative for COVID rapid test.  CT 

head, old meningioma, 3.8 x 3 cm, stable.



FINAL IMPRESSION:

1.  Dementia, progressively getting worse.

2.  Generalized weakness.

3.  Meningioma, stable.

4.  Chronic obstructive pulmonary disease.

5.  Fibromyalgia.

6.  Hypertension.

7.  Hyperlipidemia.



PLAN:  At this time, had extensive discussion with the patient's son who is a 

DPOA and is expressing that the patient is refusing treatments at home and he 

would like to get cognitive function evaluation, so he could probably take over 

the guardianship.  We will have a Neurology consult.  Unfortunately, we do not 

have any psychiatrist here in this hospital.  Also recommended that the patient 

would benefit probably from inpatient psychiatric facility at [facility] and the patient's son is not willing at this point to send her there 

and would like to take her back home and try to get 24-hour coverage.  In the 

past, I discussed about hospice and the patient is not ready yet for hospice at 

this point.







KARTIK

DR: Saloni   DD: 11/29/2021 11:45

DT: 11/29/2021 12:02   TID: [id number]

## 2021-11-29 NOTE — PDOC
Provider Note


Date of Service:


DATE: 11/29/21 


TIME: 11:45


Provider Note


Pt seen .H&P dictated.#[id number].


Neuro consult.


pts  son want  to take her  back home after neuro consult.


social service  consult.


Offered she might  benefit  from inpatient at [facility]  at [place],pt son

refusing  at present.





Justifications for Admission


Other Justification














SUSANNA WORRELL MD             Nov 29, 2021 11:47

## 2021-11-29 NOTE — RAD
EXAMINATION: XR CHEST 1V



CLINICAL HISTORY: COPD



EXAM DATE/TIME: 11/29/2021 10:53 AM



COMPARISON: 7/16/2020





FINDINGS: 



Lines, Tubes, and Devices: None.



Cardiomediastinal Silhouette: Cardiomegaly and aortic atherosclerotic calcification, similar to prior
 study.



Lungs and Pleura: Stable appearance of the left lower lung zone. Nonspecific mild interstitial promin
ence, similar to prior study. Lungs otherwise essentially clear.



Bones and Soft Tissues: Degenerative changes in the thoracic spine.

 



IMPRESSION:



No evidence of acute cardiopulmonary abnormality or significant interval change.



Electronically signed by: Willi Waite DO (11/29/2021 1:22 PM) [id number]

## 2021-11-29 NOTE — NUR
SW following. Discussed with RN, pt from home alone, 2L, cardiac diet, rapid COVID-19 
negative, PCR pending. PT/OT ordered. Per RN, family requesting to speak with SW regarding 
the plan. SW spoke with pt's son/DPOA Oneida Hamlin (ph: [phone]). SW explained that the 
patient just got here, therapy hasn't worked with her yet and it is only day one. SW 
explained that pt likely won't qualify for a [facility] stay but that we can revisit that 
after having some more time with the patient, and if it is recommended. Abu verbalized 
understanding. SW encouraged Abu to find the DPOA paperwork and wait to hear from SW when 
more information is known. SW will continue to follow.

## 2021-11-29 NOTE — PDOC2
CONSULT


Date of Consult


Date of Consult


DATE: 11/29/21 


TIME: 10:58





Reason for Consult


Reason for Consult:


Cognitive evaluation for assessment of dementia progression





Referring Physician


Referring Physician:


Dr. Miller, Internal Medicine





Identification/Chief Complaint


Chief Complaint


Altered cognition





Source


Source:  Chart review





History of Present Illness


Reason for Visit:


81-year-old, Afro-American,?Hand woman who was admitted after being found on 

the floor by her son, as per RN.


Neurology is consulted to assess her current status in the setting of a 

progressive dementing illness.





Past Medical History


Cardiovascular:  HTN, Hyperlipidemia


Pulmonary:  COPD, Other


CENTRAL NERVOUS SYSTEM:  Dementia, Other


GI:  Other


Musculoskeletal:  Osteoarthritis


Rheumatologic:  Fibromyalgia, Other


Infectious disease:  No pertinent hx


Endocrine:  Other





Past Surgical History


Past Surgical History:  Hysterectomy, Other





Family History


Family History:  Coronary Artery Disease





Social History


ALCOHOL:  none


Drugs:  None


Lives:  with Family





Current Problem List


Problem List


Problems


Medical Problems:


(1) Dementia


Status: Acute  











Current Medications


Current Medications





Current Medications


Sodium Chloride 1,000 ml @  1,000 mls/hr 1X  ONCE IV  Last administered on 

11/28/21at 19:22;  Start 11/28/21 at 19:15;  Stop 11/28/21 at 20:14;  Status DC


Lorazepam (Ativan Inj) 1 mg 1X  ONCE IVP  Last administered on 11/28/21at 21:35;

 Start 11/28/21 at 21:00;  Stop 11/28/21 at 21:01;  Status DC


Info (FLU VACCINE SCREEN per RX) 1 each 1X  ONCE MC ;  Start 11/29/21 at 09:00; 

Stop 11/29/21 at 09:01;  Status DC


Acetaminophen (Tylenol) 650 mg PRN Q4HRS  PRN PO PAIN/FEVER;  Start 11/29/21 at 

08:45


Albuterol Sulfate (Ventolin Neb Soln) 2.5 mg PRN Q6HRS  PRN INH SHORTNESS OF 

BREATH;  Start 11/29/21 at 08:45


Amlodipine Besylate (Norvasc) 10 mg DAILY PO  Last administered on 11/29/21at 0

9:46;  Start 11/29/21 at 09:30


Aspirin (Ecotrin) 81 mg DAILYWBKFT PO  Last administered on 11/29/21at 09:46;  

Start 11/29/21 at 09:30


Atorvastatin Calcium (Lipitor) 10 mg QHS PO ;  Start 11/29/21 at 21:00


Bupropion HCl (Wellbutrin Xl) 150 mg DAILY PO  Last administered on 11/29/21at 

09:46;  Start 11/29/21 at 09:30


Clonazepam (KlonoPIN) 0.5 mg BID PO  Last administered on 11/29/21at 09:46;  

Start 11/29/21 at 09:30


Docusate Sodium (Colace) 100 mg BID PO  Last administered on 11/29/21at 09:46;  

Start 11/29/21 at 09:30


Donepezil HCl (Aricept) 5 mg DAILY PO  Last administered on 11/29/21at 09:46;  

Start 11/29/21 at 09:30


Gabapentin (Neurontin) 300 mg TID PO  Last administered on 11/29/21at 09:46;  

Start 11/29/21 at 09:30


Polyethylene Glycol (miraLAX PACKET) 17 gm DAILY PO  Last administered on 

11/29/21at 09:45;  Start 11/29/21 at 09:30


Prednisone (Prednisone) 5 mg DAILY PO  Last administered on 11/29/21at 09:47;  

Start 11/29/21 at 09:30


Tizanidine HCl (Zanaflex) 4 mg PRN TID  PRN PO MUSCLE SPASMS;  Start 11/29/21 at

08:45


Lisinopril (Prinivil) 40 mg DAILY PO  Last administered on 11/29/21at 09:46;  

Start 11/29/21 at 09:30


Non-Formulary Medication (Budesonide/ Formoterol Fumarate (Symbicort 160-4.5 Mcg

Inhaler)) 2 puff BID IH ;  Start 11/29/21 at 09:00;  Status UNV


Duloxetine HCl (Cymbalta) 60 mg DAILY PO  Last administered on 11/29/21at 09:46;

 Start 11/29/21 at 09:30


Albuterol Sulfate (Ventolin Neb Soln) 2.5 mg RTQID NEB ;  Start 11/29/21 at 

12:00


Budesonide (Pulmicort) 0.5 mg RTBID NEB ;  Start 11/29/21 at 12:00





Active Scripts


Active


Aspirin Ec (Aspirin) 81 Mg Tablet.dr 81 Mg PO DAILYWBKFT 30 Days


Atorvastatin Calcium 10 Mg Tablet 10 Mg PO QHS 30 Days


Aricept (Donepezil Hcl) 5 Mg Tablet 5 Mg PO DAILY 30 Days


Docusate Sodium 100 Mg Capsule 1 Cap PO BID 15 Days


Miralax (Polyethylene Glycol 3350) 119 Gm Powder 17 Gm PO DAILY


     dissolve in water


Cyclobenzaprine Hcl 10 Mg Tablet 1 Tab PO TID


Gabapentin 300 Mg Capsule 300 Mg PO TID 30 Days


Clonazepam ** (Clonazepam) 0.5 Mg Tablet 0.5 Mg PO BID 30 Days


Reported


Duloxetine Hcl 60 Mg Capsule.dr 60 Mg PO DAILY


Tizanidine Hcl 4 Mg Tablet 4 Mg PO PRN TID PRN


Symbicort 160-4.5 Mcg Inhaler (Budesonide/Formoterol Fumarate) 10.2 Gm 

Hfa.aer.ad 2 Puff IH BID


Benazepril Hcl 40 Mg Tablet 40 Mg PO DAILY


Prednisone ** (Prednisone) 10 Mg Tablet 5 Mg PO DAILY


Hydrocodone-Apap   ** (Hydrocodone Bit/Acetaminophen) 1 Each Tablet 1-2 

Tab PO PRN Q4HRS PRN


Proair Hfa Inhaler (Albuterol Sulfate) 8.5 Gm Hfa.aer.ad 1 Puff INH PRN Q6HRS 

PRN


Bupropion Xl (Bupropion Hcl) 150 Mg Tab.er.24h 150 Mg PO DAILY


Tylenol (Acetaminophen) 325 Mg Tablet 2 Tab PO PRN Q4HRS


Amlodipine Besylate 10 Mg Tablet 10 Mg PO DAILY





Allergies


Allergies:  


Coded Allergies:  


     Penicillins (Verified  Allergy, Intermediate, Rash, 4/5/17)


     ibuprofen (Verified  Allergy, Intermediate, rash, 3/13/15)


     morphine (Verified  Adverse Reaction, Intermediate, Itching, 1/7/20)





Physical Exam


Physical Exam


Neurologic Examination 


Mental Status Exam:


Attention: Awake, alert not answering any questions about orientation or current

events.


Language: Speech is hypofluent without word finding difficulties or paraphasias.

 Naming is not normal for object details and there seems to be neuropraxis.  She

answers "I do not know" to most of my questions.


Memory: Not tested


Visual spatial skills: There is no visual neglect





Cranial Nerves:  


Cranial nerve I: Not tested


Cranial nerve II: Visual fields are full to threat


Cranial nerve III, IV and VI: She does not follow commands but does track in the

horizontal plane bilaterally.


Cranial nerve V: Normal facial sensation


Cranial nerve VII: No obvious facial symmetry


Cranial nerve VIII: Not formally test


Cranial nerve IX, X: Not cooperative for test


Cranial nerve XI: Not tested


Cranial nerve XII: Not tested





Motor Examination: Strength seems to be 5/5 diffusely.  There are no involuntary

movements and she did not cooperate with the rest examination.





Sensory Examination: She consents to normality of light touch diffusely


Cortical Sensory Functions: Not tested


General:  Alert, Cooperative, No acute distress


HEENT:  Atraumatic, Mucous membr. moist/pink


Extremities:  No clubbing


Neuro:  Normal speech, Strength at 5/5 X4 ext, Normal tone, Sensation intact


MUSCULOSKELETAL:  No muscular tenderness noted, Full range of motion without 

pain





Vitals


VITALS





Vital Signs








  Date Time  Temp Pulse Resp B/P (MAP) Pulse Ox O2 Delivery O2 Flow Rate FiO2


 


11/29/21 09:46  92  162/94    


 


11/29/21 08:00      Nasal Cannula 2.0 


 


11/29/21 07:59 97.7  18  97   





 97.7       











Labs


Labs





Laboratory Tests








Test


 11/28/21


18:59 11/28/21


19:15 11/28/21


21:45 11/29/21


07:30


 


White Blood Count


 7.5 x10^3/uL


(4.0-11.0) 


 


 6.4 x10^3/uL


(4.0-11.0)


 


Red Blood Count


 3.92 x10^6/uL


(3.50-5.40) 


 


 3.54 x10^6/uL


(3.50-5.40)


 


Hemoglobin


 10.9 g/dL


(12.0-15.5) 


 


 9.7 g/dL


(12.0-15.5)


 


Hematocrit


 33.1 %


(36.0-47.0) 


 


 30.4 %


(36.0-47.0)


 


Mean Corpuscular Volume 84 fL ()    86 fL () 


 


Mean Corpuscular Hemoglobin 28 pg (25-35)    27 pg (25-35) 


 


Mean Corpuscular Hemoglobin


Concent 33 g/dL


(31-37) 


 


 32 g/dL


(31-37)


 


Red Cell Distribution Width


 18.6 %


(11.5-14.5) 


 


 18.7 %


(11.5-14.5)


 


Platelet Count


 435 x10^3/uL


(140-400) 


 


 430 x10^3/uL


(140-400)


 


Neutrophils (%) (Auto) 71 % (31-73)    68 % (31-73) 


 


Lymphocytes (%) (Auto) 16 % (24-48)    18 % (24-48) 


 


Monocytes (%) (Auto) 11 % (0-9)    10 % (0-9) 


 


Eosinophils (%) (Auto) 2 % (0-3)    3 % (0-3) 


 


Basophils (%) (Auto) 1 % (0-3)    1 % (0-3) 


 


Neutrophils # (Auto)


 5.3 x10^3/uL


(1.8-7.7) 


 


 4.3 x10^3/uL


(1.8-7.7)


 


Lymphocytes # (Auto)


 1.2 x10^3/uL


(1.0-4.8) 


 


 1.1 x10^3/uL


(1.0-4.8)


 


Monocytes # (Auto)


 0.8 x10^3/uL


(0.0-1.1) 


 


 0.7 x10^3/uL


(0.0-1.1)


 


Eosinophils # (Auto)


 0.1 x10^3/uL


(0.0-0.7) 


 


 0.2 x10^3/uL


(0.0-0.7)


 


Basophils # (Auto)


 0.1 x10^3/uL


(0.0-0.2) 


 


 0.0 x10^3/uL


(0.0-0.2)


 


Sodium Level


 142 mmol/L


(136-145) 


 


 146 mmol/L


(136-145)


 


Potassium Level


 4.2 mmol/L


(3.5-5.1) 


 


 3.6 mmol/L


(3.5-5.1)


 


Chloride Level


 99 mmol/L


() 


 


 103 mmol/L


()


 


Carbon Dioxide Level


 29 mmol/L


(21-32) 


 


 30 mmol/L


(21-32)


 


Anion Gap 14 (6-14)    13 (6-14) 


 


Blood Urea Nitrogen


 18 mg/dL


(7-20) 


 


 14 mg/dL


(7-20)


 


Creatinine


 0.5 mg/dL


(0.6-1.0) 


 


 0.4 mg/dL


(0.6-1.0)


 


Estimated GFR


(Cockcroft-Gault) 143.6 


 


 


 185.8 





 


BUN/Creatinine Ratio 36 (6-20)    35 (6-20) 


 


Glucose Level


 90 mg/dL


(70-99) 


 


 82 mg/dL


(70-99)


 


Calcium Level


 10.1 mg/dL


(8.5-10.1) 


 


 9.4 mg/dL


(8.5-10.1)


 


Total Bilirubin


 0.5 mg/dL


(0.2-1.0) 


 


 0.4 mg/dL


(0.2-1.0)


 


Aspartate Amino Transf


(AST/SGOT) 31 U/L (15-37) 


 


 


 17 U/L (15-37) 





 


Alanine Aminotransferase


(ALT/SGPT) 15 U/L (14-59) 


 


 


 12 U/L (14-59) 





 


Alkaline Phosphatase


 93 U/L


() 


 


 86 U/L


()


 


Creatine Kinase


 89 U/L


() 


 


 





 


Total Protein


 8.3 g/dL


(6.4-8.2) 


 


 6.7 g/dL


(6.4-8.2)


 


Albumin


 3.4 g/dL


(3.4-5.0) 


 


 3.0 g/dL


(3.4-5.0)


 


Albumin/Globulin Ratio 0.7 (1.0-1.7)    0.8 (1.0-1.7) 


 


SARS-CoV-2 Antigen (Rapid)


 


 Negative


(NEGATIVE) 


 





 


Urine Collection Type   U cath  


 


Urine Color   Yellow  


 


Urine Clarity   Clear  


 


Urine pH   6.0 (<5.0-8.0)  


 


Urine Specific Gravity


 


 


 1.025


(1.000-1.030) 





 


Urine Protein


 


 


 100 mg/dL


(NEG-TRACE) 





 


Urine Glucose (UA)


 


 


 Negative mg/dL


(NEG) 





 


Urine Ketones (Stick)


 


 


 >=80 mg/dL


(NEG) 





 


Urine Blood   Negative (NEG)  


 


Urine Nitrite   Negative (NEG)  


 


Urine Bilirubin   Moderate (NEG)  


 


Urine Urobilinogen Dipstick


 


 


 1.0 mg/dL (0.2


mg/dL) 





 


Urine Leukocyte Esterase   Negative (NEG)  


 


Urine RBC   0 /HPF (0-2)  


 


Urine WBC   0 /HPF (0-4)  


 


Urine Squamous Epithelial


Cells 


 


 Few /LPF 


 





 


Urine Bacteria   0 /HPF (0-FEW)  


 


Urine Mucus   Mod /LPF  








Laboratory Tests








Test


 11/28/21


18:59 11/28/21


19:15 11/28/21


21:45 11/29/21


07:30


 


White Blood Count


 7.5 x10^3/uL


(4.0-11.0) 


 


 6.4 x10^3/uL


(4.0-11.0)


 


Red Blood Count


 3.92 x10^6/uL


(3.50-5.40) 


 


 3.54 x10^6/uL


(3.50-5.40)


 


Hemoglobin


 10.9 g/dL


(12.0-15.5) 


 


 9.7 g/dL


(12.0-15.5)


 


Hematocrit


 33.1 %


(36.0-47.0) 


 


 30.4 %


(36.0-47.0)


 


Mean Corpuscular Volume 84 fL ()    86 fL () 


 


Mean Corpuscular Hemoglobin 28 pg (25-35)    27 pg (25-35) 


 


Mean Corpuscular Hemoglobin


Concent 33 g/dL


(31-37) 


 


 32 g/dL


(31-37)


 


Red Cell Distribution Width


 18.6 %


(11.5-14.5) 


 


 18.7 %


(11.5-14.5)


 


Platelet Count


 435 x10^3/uL


(140-400) 


 


 430 x10^3/uL


(140-400)


 


Neutrophils (%) (Auto) 71 % (31-73)    68 % (31-73) 


 


Lymphocytes (%) (Auto) 16 % (24-48)    18 % (24-48) 


 


Monocytes (%) (Auto) 11 % (0-9)    10 % (0-9) 


 


Eosinophils (%) (Auto) 2 % (0-3)    3 % (0-3) 


 


Basophils (%) (Auto) 1 % (0-3)    1 % (0-3) 


 


Neutrophils # (Auto)


 5.3 x10^3/uL


(1.8-7.7) 


 


 4.3 x10^3/uL


(1.8-7.7)


 


Lymphocytes # (Auto)


 1.2 x10^3/uL


(1.0-4.8) 


 


 1.1 x10^3/uL


(1.0-4.8)


 


Monocytes # (Auto)


 0.8 x10^3/uL


(0.0-1.1) 


 


 0.7 x10^3/uL


(0.0-1.1)


 


Eosinophils # (Auto)


 0.1 x10^3/uL


(0.0-0.7) 


 


 0.2 x10^3/uL


(0.0-0.7)


 


Basophils # (Auto)


 0.1 x10^3/uL


(0.0-0.2) 


 


 0.0 x10^3/uL


(0.0-0.2)


 


Sodium Level


 142 mmol/L


(136-145) 


 


 146 mmol/L


(136-145)


 


Potassium Level


 4.2 mmol/L


(3.5-5.1) 


 


 3.6 mmol/L


(3.5-5.1)


 


Chloride Level


 99 mmol/L


() 


 


 103 mmol/L


()


 


Carbon Dioxide Level


 29 mmol/L


(21-32) 


 


 30 mmol/L


(21-32)


 


Anion Gap 14 (6-14)    13 (6-14) 


 


Blood Urea Nitrogen


 18 mg/dL


(7-20) 


 


 14 mg/dL


(7-20)


 


Creatinine


 0.5 mg/dL


(0.6-1.0) 


 


 0.4 mg/dL


(0.6-1.0)


 


Estimated GFR


(Cockcroft-Gault) 143.6 


 


 


 185.8 





 


BUN/Creatinine Ratio 36 (6-20)    35 (6-20) 


 


Glucose Level


 90 mg/dL


(70-99) 


 


 82 mg/dL


(70-99)


 


Calcium Level


 10.1 mg/dL


(8.5-10.1) 


 


 9.4 mg/dL


(8.5-10.1)


 


Total Bilirubin


 0.5 mg/dL


(0.2-1.0) 


 


 0.4 mg/dL


(0.2-1.0)


 


Aspartate Amino Transf


(AST/SGOT) 31 U/L (15-37) 


 


 


 17 U/L (15-37) 





 


Alanine Aminotransferase


(ALT/SGPT) 15 U/L (14-59) 


 


 


 12 U/L (14-59) 





 


Alkaline Phosphatase


 93 U/L


() 


 


 86 U/L


()


 


Creatine Kinase


 89 U/L


() 


 


 





 


Total Protein


 8.3 g/dL


(6.4-8.2) 


 


 6.7 g/dL


(6.4-8.2)


 


Albumin


 3.4 g/dL


(3.4-5.0) 


 


 3.0 g/dL


(3.4-5.0)


 


Albumin/Globulin Ratio 0.7 (1.0-1.7)    0.8 (1.0-1.7) 


 


SARS-CoV-2 Antigen (Rapid)


 


 Negative


(NEGATIVE) 


 





 


Urine Collection Type   U cath  


 


Urine Color   Yellow  


 


Urine Clarity   Clear  


 


Urine pH   6.0 (<5.0-8.0)  


 


Urine Specific Gravity


 


 


 1.025


(1.000-1.030) 





 


Urine Protein


 


 


 100 mg/dL


(NEG-TRACE) 





 


Urine Glucose (UA)


 


 


 Negative mg/dL


(NEG) 





 


Urine Ketones (Stick)


 


 


 >=80 mg/dL


(NEG) 





 


Urine Blood   Negative (NEG)  


 


Urine Nitrite   Negative (NEG)  


 


Urine Bilirubin   Moderate (NEG)  


 


Urine Urobilinogen Dipstick


 


 


 1.0 mg/dL (0.2


mg/dL) 





 


Urine Leukocyte Esterase   Negative (NEG)  


 


Urine RBC   0 /HPF (0-2)  


 


Urine WBC   0 /HPF (0-4)  


 


Urine Squamous Epithelial


Cells 


 


 Few /LPF 


 





 


Urine Bacteria   0 /HPF (0-FEW)  


 


Urine Mucus   Mod /LPF  











Assessment/Plan


Assessment/Plan


Progressive dementing illness by history for which neurology has been asked to 

perform a contour evaluation.  This is not the setting all the proper time, due 

to the possibility of her having an infectious process although she is afebrile;

rapid Covid test is negative but we are awaiting the serology take 48 hours.





Also, I have no previous values to discern if she is worsening or not.





If the family would like a Formal Cognitive Evaluation They Should Do so As an 

Outpatient When She Is at Her Best with formal neuropsychological testing





Neurology is signing off.











HUY LUDWIG MD               Nov 29, 2021 11:11

## 2021-11-30 VITALS — DIASTOLIC BLOOD PRESSURE: 64 MMHG | SYSTOLIC BLOOD PRESSURE: 145 MMHG

## 2021-11-30 VITALS — SYSTOLIC BLOOD PRESSURE: 144 MMHG | DIASTOLIC BLOOD PRESSURE: 66 MMHG

## 2021-11-30 VITALS — DIASTOLIC BLOOD PRESSURE: 72 MMHG | SYSTOLIC BLOOD PRESSURE: 148 MMHG

## 2021-11-30 VITALS — SYSTOLIC BLOOD PRESSURE: 155 MMHG | DIASTOLIC BLOOD PRESSURE: 75 MMHG

## 2021-11-30 VITALS — DIASTOLIC BLOOD PRESSURE: 71 MMHG | SYSTOLIC BLOOD PRESSURE: 160 MMHG

## 2021-11-30 RX ADMIN — ALBUTEROL SULFATE SCH MG: 108 AEROSOL, METERED RESPIRATORY (INHALATION) at 16:00

## 2021-11-30 RX ADMIN — ALBUTEROL SULFATE SCH MG: 108 AEROSOL, METERED RESPIRATORY (INHALATION) at 19:59

## 2021-11-30 RX ADMIN — LISINOPRIL SCH MG: 20 TABLET ORAL at 08:40

## 2021-11-30 RX ADMIN — GABAPENTIN SCH MG: 300 CAPSULE ORAL at 20:46

## 2021-11-30 RX ADMIN — BUPROPION HYDROCHLORIDE SCH MG: 150 TABLET, FILM COATED, EXTENDED RELEASE ORAL at 08:40

## 2021-11-30 RX ADMIN — ATORVASTATIN CALCIUM SCH MG: 10 TABLET, FILM COATED ORAL at 20:46

## 2021-11-30 RX ADMIN — GABAPENTIN SCH MG: 300 CAPSULE ORAL at 12:38

## 2021-11-30 RX ADMIN — ALBUTEROL SULFATE SCH MG: 108 AEROSOL, METERED RESPIRATORY (INHALATION) at 12:00

## 2021-11-30 RX ADMIN — BUDESONIDE SCH MG: 0.5 INHALANT RESPIRATORY (INHALATION) at 08:00

## 2021-11-30 RX ADMIN — ACETAMINOPHEN PRN MG: 325 TABLET, FILM COATED ORAL at 06:12

## 2021-11-30 RX ADMIN — BUDESONIDE SCH MG: 0.5 INHALANT RESPIRATORY (INHALATION) at 19:59

## 2021-11-30 RX ADMIN — DONEPEZIL HYDROCHLORIDE SCH MG: 5 TABLET, FILM COATED ORAL at 08:40

## 2021-11-30 RX ADMIN — GABAPENTIN SCH MG: 300 CAPSULE ORAL at 08:40

## 2021-11-30 RX ADMIN — ASPIRIN SCH MG: 81 TABLET, COATED ORAL at 08:39

## 2021-11-30 RX ADMIN — DOCUSATE SODIUM SCH MG: 100 CAPSULE, LIQUID FILLED ORAL at 20:46

## 2021-11-30 RX ADMIN — POLYETHYLENE GLYCOL 3350 SCH GM: 17 POWDER, FOR SOLUTION ORAL at 08:40

## 2021-11-30 RX ADMIN — DOCUSATE SODIUM SCH MG: 100 CAPSULE, LIQUID FILLED ORAL at 08:39

## 2021-11-30 RX ADMIN — ALBUTEROL SULFATE SCH MG: 108 AEROSOL, METERED RESPIRATORY (INHALATION) at 08:00

## 2021-11-30 NOTE — SNU/HH DC
DISCHARGE ORDERS


DISCHARGE INFORMATION:


DISCHARGE DATE:  Dec 1, 2021


FINAL DIAGNOSIS


Problems


Medical Problems:


(1) Dementia


Status: Acute  








CONDITION ON DISCHARGE:  Stable





CODE STATUS:


Code Status:  Full





SKILLED NURSING:


SNF STAY <30 DAYS:  Yes





HOSPICE:


HOSPICE:  No





LTAC:


ADMIT TO LTAC:  No





POST DISCHARGE ORDERS:


ACTIVITY ORDERS:  Activity as tolerated


WEIGHT BEARING STATUS:  As tolerated


DIET AFTER DISCHARGE:  Cardiac





CHECKS AFTER DISCHARGE:


CHECKS AFTER DISCHARGE:  Check blood press - daily





TREATMENT/EQUIPMENT ORDERS:


ADAPTIVE EQUIPMENT NEEDED:  Walker


RESPIRATORY EQUIPMENT NEEDED:  Oxygen


Physical Therapy For:  Evalulation/Treatment


Occupational Therapy For:  Evaluation/Treatment





DISCHARGE MEDICATIONS:


Home Meds


Active Scripts


Aspirin (ASPIRIN EC) 81 Mg Tablet., 81 MG PO DAILYWBKFT for cad for 30 Days, 

#30 TAB.SR


   Prov:SUSANNA WORRELL MD         7/16/21


Atorvastatin Calcium (ATORVASTATIN CALCIUM) 10 Mg Tablet, 10 MG PO QHS for cad 

for 30 Days, #30 TAB


   Prov:SUSANNA WORRELL MD         7/16/21


Donepezil Hcl (ARICEPT) 5 Mg Tablet, 5 MG PO DAILY for dementia for 30 Days, #30

TAB


   Prov:SUSANNA WORRELL MD         7/16/21


Docusate Sodium (DOCUSATE SODIUM) 100 Mg Capsule, 1 CAP PO BID for constipation 

for 15 Days, #30 CAP 0 Refills


   Prov:ADRIAN POSADAS         6/21/21


Polyethylene Glycol 3350 (MIRALAX) 119 Gm Powder, 17 GM PO DAILY for 

constipation, #527 GM 0 Refills


   dissolve in water


   Prov:ADRIAN POSADAS         6/21/21


Gabapentin (GABAPENTIN) 300 Mg Capsule, 300 MG PO TID for 30 Days, TAB 6 Refills


   Prov:SAIDA MATUTE MD         4/9/17


Clonazepam (CLONAZEPAM **) 0.5 Mg Tablet, 0.5 MG PO BID for 30 Days, TAB 0 

Refills


   Prov:SAIDA MATUTE MD         4/9/17


Reported Medications


Duloxetine Hcl (DULOXETINE HCL) 60 Mg Capsule., 60 MG PO DAILY


   3/26/18


Tizanidine Hcl (TIZANIDINE HCL) 4 Mg Tablet, 4 MG PO PRN TID PRN for MUSCLE 

SPASMS, TAB


   3/26/18


Budesonide/Formoterol Fumarate (SYMBICORT 160-4.5 MCG INHALER) 10.2 Gm 

Hfa.aer.ad, 2 PUFF IH BID, #10.6 GM 3 Refills


   3/26/18


Benazepril Hcl (BENAZEPRIL HCL) 40 Mg Tablet, 40 MG PO DAILY


   3/26/18


Prednisone (PREDNISONE **) 10 Mg Tablet, 5 MG PO DAILY, TAB


   3/26/18


Albuterol Sulfate (PROAIR HFA INHALER) 8.5 Gm Hfa.aer.ad, 1 PUFF INH PRN Q6HRS 

PRN for SHORTNESS OF BREATH, INHALER 0 Refills


   3/26/18


Bupropion Hcl (BUPROPION XL) 150 Mg Tab.er.24h, 150 MG PO DAILY, TAB.SR


   3/26/18


Acetaminophen (TYLENOL) 325 Mg Tablet, 2 TAB PO PRN Q4HRS, #30 TAB


   4/5/17


Amlodipine Besylate (AMLODIPINE BESYLATE) 10 Mg Tablet, 10 MG PO DAILY, #30


   4/5/17


Discontinued Reported Medications


Hydrocodone Bit/Acetaminophen (HYDROCODONE-APAP   **) 1 Each Tablet, 1-2 

TAB PO PRN Q4HRS PRN for PAIN, TAB 0 Refills


   3/26/18


Discontinued Scripts


Cyclobenzaprine Hcl (CYCLOBENZAPRINE HCL) 10 Mg Tablet, 1 TAB PO TID, #30 TAB


   Prov:ADRIAN POSADAS         8/20/19











SUSANNA WORRELL MD             Nov 30, 2021 22:14

## 2021-11-30 NOTE — NUR
SW following. Discussed with RN, SW spoke with pt's son, Oneida, got DPOA paperwork. Referral 
faxed to [facility] in [place], awaiting acceptance decision. Plan is for Oneida to take pt to 
his home if pt is not accepted at [facility]. Abu agreeable to home health. TONEY will 
continue to follow.

## 2021-11-30 NOTE — PDOC
PROGRESS NOTES


Date of Service:


DATE: 11/30/21 


TIME: 08:49





Subjective


Subjective


pt less agitated , calm son at bedside, pt remembers my name.





Objective


Objective





Vital Signs








  Date Time  Temp Pulse Resp B/P (MAP) Pulse Ox O2 Delivery O2 Flow Rate FiO2


 


11/30/21 08:40  74  144/66    


 


11/29/21 23:00   16  94 Nasal Cannula 2.0 


 


11/29/21 15:00 98.0       





 98.0       














Intake and Output 


 


 11/30/21





 06:59


 


Intake Total 820 ml


 


Balance 820 ml


 


 


 


Intake Oral 820 ml


 


# Voids 3











Physical Exam


Extremities:  No clubbing


General:  Alert, Cooperative, No acute distress


HEENT:  Atraumatic, Mucous membr. moist/pink


MUSCULOSKELETAL:  No muscular tenderness noted, Full range of motion without 

pain


Neuro:  Normal speech, Strength at 5/5 X4 ext, Normal tone, Sensation intact





Diagnosis


Problem List


Problems


Medical Problems:


(1) Dementia


Status: Acute  











Assessment


Assessment


Problems


Medical Problems:


(1) Dementia


Status: Acute  





FINAL IMPRESSION:


1.  Dementia, progressively getting worse.


2.  Generalized weakness.


3.  Meningioma, stable.


4.  Chronic obstructive pulmonary disease.


5.  Fibromyalgia.


6.  Hypertension.


7.  Hyperlipidemia.





PLAN:  


ct head no cva


labs good


cxr -ve 


covid -ve


spoke  with son at bedside


disccused  options  for inpatient geripsyche in [place]


ultimately pts son  want to take her to his homw in [place] for her to stay with him

, he works in day time  he want  home care in daytime  ,he can be with her  at  

night


spoke with   and  social work.


Neuro consult noted.





 At this time, had extensive discussion with the patient's son who is a 


DPOA and is expressing that the patient is refusing treatments at home and he 


would like to get cognitive function evaluation, so he could probably take over 


the guardianship.  We will have a Neurology consult.  Unfortunately, we do not 


have any psychiatrist here in this hospital.  Also recommended that the patient 


would benefit probably from inpatient psychiatric facility at [facility] and the patient's son is not willing at this point to send her there 


and would like to take her back home and try to get 24-hour coverage.  In the 


past, I discussed about hospice and the patient is not ready yet for hospice at 


this point.





Plan


Plan of Care


Problems


Medical Problems:


(1) Dementia


Status: Acute  








Comment


Review of Relevant


I have reviewed the following items unique (where applicable) has been applied.


Medications





Current Medications


Albuterol Sulfate (Ventolin Neb Soln) 2.5 mg RTQID NEB  Last administered on 

11/29/21at 16:58;  Start 11/29/21 at 12:00


Amlodipine Besylate (Norvasc) 10 mg DAILY PO  Last administered on 11/30/21at 

08:40;  Start 11/29/21 at 09:30


Aspirin (Ecotrin) 81 mg DAILYWBKFT PO  Last administered on 11/30/21at 08:39;  

Start 11/29/21 at 09:30


Atorvastatin Calcium (Lipitor) 10 mg QHS PO  Last administered on 11/29/21at 

21:04;  Start 11/29/21 at 21:00


Budesonide (Pulmicort) 0.5 mg RTBID NEB  Last administered on 11/29/21at 16:58; 

Start 11/29/21 at 12:00


Bupropion HCl (Wellbutrin Xl) 150 mg DAILY PO  Last administered on 11/30/21at 

08:40;  Start 11/29/21 at 09:30


Clonazepam (KlonoPIN) 0.5 mg BID PO  Last administered on 11/30/21at 08:40;  

Start 11/29/21 at 09:30


Docusate Sodium (Colace) 100 mg BID PO  Last administered on 11/30/21at 08:39;  

Start 11/29/21 at 09:30


Donepezil HCl (Aricept) 5 mg DAILY PO  Last administered on 11/30/21at 08:40;  

Start 11/29/21 at 09:30


Duloxetine HCl (Cymbalta) 60 mg DAILY PO  Last administered on 11/30/21at 08:39;

 Start 11/29/21 at 09:30


Gabapentin (Neurontin) 300 mg TID PO  Last administered on 11/30/21at 08:40;  

Start 11/29/21 at 09:30


Influenza Virus Vaccine Quadrival (Flulaval Quad 8065-1767 Syringe) 0.5 ml ONCE 

ONCE VAX IM ;  Start 11/29/21 at 21:00;  Stop 11/29/21 at 21:01;  Status DC


Info (FLU VACCINE SCREEN per RX) 1 each 1X  ONCE MC ;  Start 11/29/21 at 09:00; 

Stop 11/29/21 at 09:01;  Status DC


Lisinopril (Prinivil) 40 mg DAILY PO  Last administered on 11/30/21at 08:40;  

Start 11/29/21 at 09:30


Non-Formulary Medication (Budesonide/ Formoterol Fumarate (Symbicort 160-4.5 Mcg

Inhaler)) 2 puff BID IH ;  Start 11/29/21 at 09:00;  Status UNV


Polyethylene Glycol (miraLAX PACKET) 17 gm DAILY PO  Last administered on 

11/30/21at 08:40;  Start 11/29/21 at 09:30


Prednisone (Prednisone) 5 mg DAILY PO  Last administered on 11/30/21at 08:40;  

Start 11/29/21 at 09:30


Vitals/I & O





Vital Sign - Last 24 Hours








 11/29/21 11/29/21 11/29/21 11/29/21





 09:46 09:46 11:00 15:00


 


Temp   98.0 98.0





   98.0 98.0


 


Pulse 92 92 87 86


 


Resp   18 18


 


B/P (MAP) 162/94 162/94 155/90 (111) 128/65 (86)


 


Pulse Ox   95 97


 


O2 Delivery   Nasal Cannula Nasal Cannula


 


O2 Flow Rate   2.0 2.0


 


    





    





 11/29/21 11/29/21 11/29/21 11/29/21





 16:59 19:00 20:00 23:00


 


Pulse  78  66


 


Resp  16  16


 


B/P (MAP)  141/66 (91)  108/57 (74)


 


Pulse Ox  100  94


 


O2 Delivery Nasal Cannula Nasal Cannula Nasal Cannula Nasal Cannula


 


O2 Flow Rate 2.0 2.0 2.0 2.0





 11/30/21 11/30/21  





 08:40 08:40  


 


Pulse 74 74  


 


B/P (MAP) 144/66 144/66  














Intake and Output   


 


 11/29/21 11/29/21 11/30/21





 14:59 22:59 06:59


 


Intake Total 480 ml 340 ml 


 


Balance 480 ml 340 ml 











Justifications for Admission


Other Justification














SUSANNA WORRELL MD             Nov 30, 2021 08:49

## 2021-11-30 NOTE — NUR
Pt c/o pain to her left side, towards the lumbar area, 5/10. Given her pain med/muscle 
relaxant and denies pain this morning but c/o headache. Given tylenol.

## 2021-12-01 VITALS — DIASTOLIC BLOOD PRESSURE: 79 MMHG | SYSTOLIC BLOOD PRESSURE: 172 MMHG

## 2021-12-01 VITALS — DIASTOLIC BLOOD PRESSURE: 73 MMHG | SYSTOLIC BLOOD PRESSURE: 144 MMHG

## 2021-12-01 VITALS — DIASTOLIC BLOOD PRESSURE: 79 MMHG | SYSTOLIC BLOOD PRESSURE: 142 MMHG

## 2021-12-01 VITALS — SYSTOLIC BLOOD PRESSURE: 142 MMHG | DIASTOLIC BLOOD PRESSURE: 68 MMHG

## 2021-12-01 VITALS — SYSTOLIC BLOOD PRESSURE: 159 MMHG | DIASTOLIC BLOOD PRESSURE: 75 MMHG

## 2021-12-01 VITALS — DIASTOLIC BLOOD PRESSURE: 63 MMHG | SYSTOLIC BLOOD PRESSURE: 136 MMHG

## 2021-12-01 RX ADMIN — GABAPENTIN SCH MG: 300 CAPSULE ORAL at 08:22

## 2021-12-01 RX ADMIN — ACETAMINOPHEN PRN MG: 325 TABLET, FILM COATED ORAL at 14:08

## 2021-12-01 RX ADMIN — ALBUTEROL SULFATE SCH MG: 108 AEROSOL, METERED RESPIRATORY (INHALATION) at 07:08

## 2021-12-01 RX ADMIN — POLYETHYLENE GLYCOL 3350 SCH GM: 17 POWDER, FOR SOLUTION ORAL at 08:22

## 2021-12-01 RX ADMIN — DONEPEZIL HYDROCHLORIDE SCH MG: 5 TABLET, FILM COATED ORAL at 08:22

## 2021-12-01 RX ADMIN — ASPIRIN SCH MG: 81 TABLET, COATED ORAL at 08:20

## 2021-12-01 RX ADMIN — GABAPENTIN SCH MG: 300 CAPSULE ORAL at 20:38

## 2021-12-01 RX ADMIN — BUPROPION HYDROCHLORIDE SCH MG: 150 TABLET, FILM COATED, EXTENDED RELEASE ORAL at 08:22

## 2021-12-01 RX ADMIN — TIZANIDINE PRN MG: 4 TABLET ORAL at 04:09

## 2021-12-01 RX ADMIN — ACETAMINOPHEN PRN MG: 325 TABLET, FILM COATED ORAL at 04:09

## 2021-12-01 RX ADMIN — LISINOPRIL SCH MG: 20 TABLET ORAL at 11:22

## 2021-12-01 RX ADMIN — DOCUSATE SODIUM SCH MG: 100 CAPSULE, LIQUID FILLED ORAL at 08:22

## 2021-12-01 RX ADMIN — DOCUSATE SODIUM SCH MG: 100 CAPSULE, LIQUID FILLED ORAL at 20:38

## 2021-12-01 RX ADMIN — ALBUTEROL SULFATE SCH MG: 108 AEROSOL, METERED RESPIRATORY (INHALATION) at 12:00

## 2021-12-01 RX ADMIN — BUDESONIDE SCH MG: 0.5 INHALANT RESPIRATORY (INHALATION) at 07:08

## 2021-12-01 RX ADMIN — GABAPENTIN SCH MG: 300 CAPSULE ORAL at 13:59

## 2021-12-01 RX ADMIN — ALBUTEROL SULFATE SCH MG: 108 AEROSOL, METERED RESPIRATORY (INHALATION) at 16:11

## 2021-12-01 RX ADMIN — ATORVASTATIN CALCIUM SCH MG: 10 TABLET, FILM COATED ORAL at 20:38

## 2021-12-01 NOTE — NUR
TONEY following. Discussed with RN, [facility] stated pt is not needing psych placement. TONEY 
discussed with pt's on, he requested SW try [facility] for SNF. Referral phoned and faxed. 
Awaiting acceptance decision. TONEY explained to Oneida the steps with SNF referrals and approvals 
etc. TONEY discussed back up options if facility unable to accept. Discussed with Dr. Miller. 
TONEY will continue to follow.

-------------------------------------------------------------------------------

Addendum: 12/01/21 at 1605 by LIEN ZIEGLER

-------------------------------------------------------------------------------

[facility] declined. Referral faxed to [facility], pt accepted pending insurance auth. TONEY 
notified pt's son, Oneida.

## 2021-12-01 NOTE — NUR
Patient is refusing breathing treatments.  She also refused breathing treatments x 4 
yesterday.  DYLLAN Quevedo informed.

## 2021-12-01 NOTE — PDOC
PROGRESS NOTES


Date of Service:


DATE: 12/1/21 


TIME: 09:04





Subjective


Subjective


no new problems,waiting  for discharge





Objective


Objective





Vital Signs








  Date Time  Temp Pulse Resp B/P (MAP) Pulse Ox O2 Delivery O2 Flow Rate FiO2


 


12/1/21 08:28      Nasal Cannula 2.0 


 


12/1/21 07:30 97.9 73 18 136/63 (87) 93   





 97.9       














Intake and Output 


 


 12/1/21





 07:00


 


 


 


# Voids 2


 


# Bowel Movements 1











Physical Exam


Abdomen:  Soft


Heart:  Normal S1, Normal S2


Extremities:  No clubbing


General:  Alert, Cooperative, No acute distress


HEENT:  Atraumatic, Mucous membr. moist/pink


MUSCULOSKELETAL:  No muscular tenderness noted, Full range of motion without 

pain


Neuro:  Normal speech, Strength at 5/5 X4 ext, Normal tone, Sensation intact


Skin:  No breakdown





Diagnosis


Problem List


Problems


Medical Problems:


(1) Dementia


Status: Acute  











Assessment


Assessment


Problems


Medical Problems:


(1) Dementia


Status: Acute  





FINAL IMPRESSION:


1.  Dementia, progressively getting worse.


2.  Generalized weakness.


3.  Meningioma, stable.


4.  Chronic obstructive pulmonary disease.


5.  Fibromyalgia.


6.  Hypertension.


7.  Hyperlipidemia.





PLAN: discharge today. 


ct head no cva,old meningioma stable


labs good


cxr -ve 


covid -ve


spoke  with son at bedside yesterday


disccused  options  for inpatient geripsyche in [place]


ultimately pts son  want to take her to his home in [place] for her to stay with him

, he works in day time  he want  home care in daytime  ,he can be with her  at  

night


spoke with   and  social work.


Neuro consult noted.





 At this time, had extensive discussion with the patient's son who is a 


DPOA and is expressing that the patient is refusing treatments at home and he 


would like to get cognitive function evaluation, so he could probably take over 


the guardianship.  We will have a Neurology consult.  Unfortunately, we do not 


have any psychiatrist here in this hospital.  Also recommended that the patient 


would benefit probably from inpatient psychiatric facility at [facility] and the patient's son is not willing at this point to send her there 


and would like to take her back home and try to get 24-hour coverage.  In the 


past, I discussed about hospice and the patient is not ready yet for hospice at 


this point.





Plan


Plan of Care


Problems


Medical Problems:


(1) Dementia


Status: Acute  








Comment


Review of Relevant


I have reviewed the following items unique (where applicable) has been applied.


Vitals/I & O





Vital Sign - Last 24 Hours








 11/30/21 11/30/21 11/30/21 11/30/21





 11:00 15:00 19:00 20:00


 


Temp 97.1 97.2 97.7 





 97.1 97.2 97.7 


 


Pulse 79 81 75 


 


Resp 20 20 17 


 


B/P (MAP) 160/71 (100) 145/64 (91) 155/75 (101) 


 


Pulse Ox 100  100 100


 


O2 Delivery Nasal Cannula  Nasal Cannula Nasal Cannula


 


O2 Flow Rate 2.0  2.0 2.0


 


    





    





 11/30/21 11/30/21 12/1/21 12/1/21





 20:00 22:51 02:54 07:09


 


Temp  97.7 97.7 





  97.7 97.7 


 


Pulse  70 70 


 


Resp  17 17 


 


B/P (MAP)  148/72 (97) 142/68 (92) 


 


Pulse Ox  100 100 88


 


O2 Delivery Nasal Cannula Nasal Cannula Nasal Cannula Room Air


 


O2 Flow Rate 2.0 2.0 2.0 


 


    





    





 12/1/21 12/1/21  





 07:30 08:28  


 


Temp 97.9   





 97.9   


 


Pulse 73   


 


Resp 18   


 


B/P (MAP) 136/63 (87)   


 


Pulse Ox 93   


 


O2 Delivery Nasal Cannula Nasal Cannula  


 


O2 Flow Rate 2.0 2.0  











Justifications for Admission


Other Justification














SUSANNA WORRELL MD              Dec 1, 2021 09:06

## 2021-12-02 VITALS — SYSTOLIC BLOOD PRESSURE: 150 MMHG | DIASTOLIC BLOOD PRESSURE: 73 MMHG

## 2021-12-02 VITALS — DIASTOLIC BLOOD PRESSURE: 71 MMHG | SYSTOLIC BLOOD PRESSURE: 148 MMHG

## 2021-12-02 VITALS — DIASTOLIC BLOOD PRESSURE: 75 MMHG | SYSTOLIC BLOOD PRESSURE: 159 MMHG

## 2021-12-02 VITALS — SYSTOLIC BLOOD PRESSURE: 160 MMHG | DIASTOLIC BLOOD PRESSURE: 82 MMHG

## 2021-12-02 RX ADMIN — DONEPEZIL HYDROCHLORIDE SCH MG: 5 TABLET, FILM COATED ORAL at 09:07

## 2021-12-02 RX ADMIN — DOCUSATE SODIUM SCH MG: 100 CAPSULE, LIQUID FILLED ORAL at 09:07

## 2021-12-02 RX ADMIN — ASPIRIN SCH MG: 81 TABLET, COATED ORAL at 07:54

## 2021-12-02 RX ADMIN — POLYETHYLENE GLYCOL 3350 SCH GM: 17 POWDER, FOR SOLUTION ORAL at 09:07

## 2021-12-02 RX ADMIN — ACETAMINOPHEN PRN MG: 325 TABLET, FILM COATED ORAL at 05:00

## 2021-12-02 RX ADMIN — BUPROPION HYDROCHLORIDE SCH MG: 150 TABLET, FILM COATED, EXTENDED RELEASE ORAL at 09:07

## 2021-12-02 RX ADMIN — GABAPENTIN SCH MG: 300 CAPSULE ORAL at 09:07

## 2021-12-02 RX ADMIN — LISINOPRIL SCH MG: 20 TABLET ORAL at 09:07

## 2021-12-02 RX ADMIN — GABAPENTIN SCH MG: 300 CAPSULE ORAL at 13:42

## 2021-12-02 NOTE — PDOC
PROGRESS NOTES


Date of Service:


DATE: 12/2/21 


TIME: 09:09





Subjective


Subjective


pt looks better  and feels better





Objective


Objective





Vital Signs








  Date Time  Temp Pulse Resp B/P (MAP) Pulse Ox O2 Delivery O2 Flow Rate FiO2


 


12/2/21 09:08  82  159/75    


 


12/2/21 08:02      Nasal Cannula 2.0 


 


12/2/21 07:00 98.4  18  97   





 98.4       














Intake and Output 


 


 12/2/21





 06:59


 


Intake Total 240 ml


 


Balance 240 ml


 


 


 


Intake Oral 240 ml


 


# Voids 2











Physical Exam


Abdomen:  Soft


Heart:  Normal S1, Normal S2


Extremities:  No clubbing


General:  Alert, Cooperative, No acute distress


HEENT:  Atraumatic, Mucous membr. moist/pink


MUSCULOSKELETAL:  No muscular tenderness noted, Full range of motion without 

pain


Neuro:  Normal speech, Strength at 5/5 X4 ext, Normal tone, Sensation intact


Skin:  No breakdown





Diagnosis


Problem List


Problems


Medical Problems:


(1) Dementia


Status: Acute  











Assessment


Assessment


Problems


Medical Problems:


(1) Dementia


Status: Acute  





FINAL IMPRESSION:


1.  Dementia, progressively getting worse.


2.  Generalized weakness.


3.  Meningioma, stable.


4.  Chronic obstructive pulmonary disease.


5.  Fibromyalgia.


6.  Hypertension.


7.  Hyperlipidemia.





PLAN: [facility]. 


ct head no cva,old meningioma stable


labs good


cxr -ve 


covid -ve


spoke  with son at bedside yesterday


discussed  options  for inpatient geripsyche in [place]


ultimately pts son  want to take her to his home in [place] for her to stay with him

, he works in day time  he want  home care in daytime  ,he can be with her  at  

night


spoke with   and  social work.


Neuro consult noted.





 At this time, had extensive discussion with the patient's son who is a 


DPOA and is expressing that the patient is refusing treatments at home and he 


would like to get cognitive function evaluation, so he could probably take over 


the guardianship.  We will have a Neurology consult.  Unfortunately, we do not 


have any psychiatrist here in this hospital.  Also recommended that the patient 


would benefit probably from inpatient psychiatric facility at [facility] and the patient's son is not willing at this point to send her there 


and would like to take her back home and try to get 24-hour coverage.  In the 


past, I discussed about hospice and the patient is not ready yet for hospice at 


this point.





Plan


Plan of Care


Problems


Medical Problems:


(1) Dementia


Status: Acute  








Comment


Review of Relevant


I have reviewed the following items unique (where applicable) has been applied.


Medications





Current Medications


Albuterol Sulfate (Ventolin Neb Soln) 2.5 mg RTQID  PRN NEB WHEEZING;  Start 

12/1/21 at 19:45


Budesonide (Pulmicort) 0.5 mg RTBID  PRN NEB WHEEZING;  Start 12/1/21 at 19:45


Vitals/I & O





Vital Sign - Last 24 Hours








 12/1/21 12/1/21 12/1/21 12/1/21





 11:00 11:22 11:22 15:00


 


Temp 98.0   97.7





 98.0   97.7


 


Pulse 76 73 73 75


 


Resp 18   18


 


B/P (MAP) 142/79 (100) 136/63 136/63 144/73 (96)


 


Pulse Ox 98   100


 


O2 Delivery Nasal Cannula   Nasal Cannula


 


O2 Flow Rate 2.0   2.0


 


    





    





 12/1/21 12/1/21 12/1/21 12/1/21





 16:12 19:00 20:00 23:00


 


Temp  97.8  97.9





  97.8  97.9


 


Pulse  85  81


 


Resp  18  16


 


B/P (MAP)  159/75 (103)  172/79 (110)


 


Pulse Ox 92 100  100


 


O2 Delivery Nasal Cannula  Nasal Cannula Nasal Cannula


 


O2 Flow Rate 2.0  2.0 2.0


 


    





    





 12/2/21 12/2/21 12/2/21 12/2/21





 03:00 07:00 08:02 09:07


 


Temp 97.7 98.4  





 97.7 98.4  


 


Pulse 86 82  82


 


Resp 18 18  


 


B/P (MAP) 160/82 (108) 159/75 (103)  159/75


 


Pulse Ox 99 97  


 


O2 Delivery Nasal Cannula Nasal Cannula Nasal Cannula 


 


O2 Flow Rate 2.0 2.0 2.0 


 


    





    





 12/2/21   





 09:08   


 


Pulse 82   


 


B/P (MAP) 159/75   














Intake and Output   


 


 12/1/21 12/1/21 12/2/21





 14:59 22:59 06:59


 


Intake Total 240 ml  


 


Balance 240 ml  











Justifications for Admission


Other Justification














SUSANNA WORRELL MD              Dec 2, 2021 09:10

## 2021-12-02 NOTE — NUR
SW following. Discussed with RN, insurance approved transfer to [facility]. [facility] 
arranged transportation for 1600. RN notified. Voicemail left for pt's son, [place]. SW will 
continue to follow.

## 2021-12-02 NOTE — NUR
Pt left unit at 1655 by wheelchair via EMS. Pt's paperwork sent with pt, report previously 
given to [facility].

## 2021-12-09 NOTE — PDOC
Provider Note


Date of Service:


DATE: 12/9/21 


TIME: 09:14


Provider Note


Discharge summary dictated.#[id number].





Justifications for Admission


Other Justification














SUSANNA WORRELL MD              Dec 9, 2021 09:14

## 2021-12-09 NOTE — DS
DATE OF DISCHARGE: 12/02/2021

REASON FOR ADMISSION TO THE HOSPITAL:  Confusion, lethargy dementia.



CONSULTATIONS:  Dr. Singleton, Neurology.



PROCEDURES DONE:  CT head.



COMPLICATIONS NOTED:  None.



HOSPITAL COURSE:  The patient is an 81-year-old female.  The patient lives at 

home.  She has a caregiver in the daytime and the patient has been more confused

lately, lethargic, agitated with some agitations. Family was concerned was 

brought to the hospital and CT head was negative and it was felt probably 

progression of the dementia.  The patient has a caregiver in the daytime, but 

nobody at night, but lately the caregiver were sick, they are trying to have an 

alternate caregiver, but the patient was confused, agitated and was brought to 

the hospital.  CT head was negative.  Seen by Neurology and no acute stroke, 

small vessel disease.  She also has fibromyalgia.  CBC was unremarkable.  Chem 

profile was unremarkable.  B12 levels were normal.  Urine was negative for 

infection.  COVID test was negative.  The patient's son expressed that he would 

like to take her home, rather than her staying at home, but in the meantime, he 

wants medications adjusted, so the best option would be a [facility] facility at

skilled nursing facility with a psychiatrist and staff.  The patient finally 

went to skilled nursing with a psychiatrist and staff.  She is already started 

on Aricept for dementia.



FINAL DIAGNOSES:

1.  Progressive dementia.

2.  Alzheimer's disease.

3.  Fibromyalgia.

4.  COPD.

5.  Hypertension.

6.  Arthritis.



DISPOSITION:  Home. See [facility] for discharge medications.







TRACIE

DR: Saloni   DD: 12/09/2021 09:13

DT: 12/09/2021 09:35   TID: [id number]